# Patient Record
Sex: FEMALE | Race: WHITE | NOT HISPANIC OR LATINO | Employment: FULL TIME | URBAN - METROPOLITAN AREA
[De-identification: names, ages, dates, MRNs, and addresses within clinical notes are randomized per-mention and may not be internally consistent; named-entity substitution may affect disease eponyms.]

---

## 2021-02-02 ENCOUNTER — HOSPITAL ENCOUNTER (OUTPATIENT)
Dept: URGENT CARE | Facility: CLINIC | Age: 21
Discharge: HOME | End: 2021-02-02
Attending: NURSE PRACTITIONER

## 2021-02-04 LAB — SARS-COV-2 RNA SPEC QL NAA+PROBE: NOT DETECTED

## 2021-08-01 ENCOUNTER — HOSPITAL ENCOUNTER (OUTPATIENT)
Facility: HOSPITAL | Age: 21
Discharge: LEFT BEFORE DISCHARGE | End: 2021-08-01
Attending: OBSTETRICS & GYNECOLOGY | Admitting: OBSTETRICS & GYNECOLOGY

## 2021-08-01 ENCOUNTER — HOSPITAL ENCOUNTER (OUTPATIENT)
Facility: HOSPITAL | Age: 21
End: 2021-08-01
Attending: OBSTETRICS & GYNECOLOGY | Admitting: OBSTETRICS & GYNECOLOGY

## 2021-08-01 PROCEDURE — G0463 HOSPITAL OUTPT CLINIC VISIT: HCPCS

## 2021-08-02 NOTE — NURSING NOTE
Patient is leaving AMA from triage. Patient educated on kick counts and early signs of labor. Informed patient to return to the hospital if she believes her water has ruptured or leaking. Written material given educating patient on concerns. Patient signed AMA paperwork.  -ju

## 2021-08-02 NOTE — NURSING NOTE
DR Stallings NOTIFIED of Roster Triage patient Complaining of contractions.    Patient requested to leave AMA patient is 0.5cm dilated. Patient stated she wanted to go to [facility] where she receives her care.

## 2021-08-02 NOTE — NURSING NOTE
Patient arrived to triage with complaints of contractions. Patient placed in triage room. Abdomen is soft and non tender upon palpation. No complaints of bleeding. Patient is feeling fetal movement. Patient's cervix was checked. Patient's cervix is 0.5cm dilated thick and high. There is no bleeding or fluid noted.   After being checked patient requested to leave AMA without further evaluation. Patient stated she did not want to receive care at this hospital and wanted to go to [facility] where she receives care.   -JU

## 2022-05-03 VITALS
HEART RATE: 83 BPM | OXYGEN SATURATION: 100 % | SYSTOLIC BLOOD PRESSURE: 137 MMHG | HEIGHT: 63 IN | RESPIRATION RATE: 18 BRPM | TEMPERATURE: 98.1 F | BODY MASS INDEX: 36.84 KG/M2 | DIASTOLIC BLOOD PRESSURE: 100 MMHG | WEIGHT: 207.89 LBS

## 2022-05-03 LAB
BASOPHILS # BLD AUTO: 0.04 10*3/MM3 (ref 0–0.2)
BASOPHILS NFR BLD AUTO: 0.4 % (ref 0–1.5)
DEPRECATED RDW RBC AUTO: 45.8 FL (ref 37–54)
EOSINOPHIL # BLD AUTO: 0.18 10*3/MM3 (ref 0–0.4)
EOSINOPHIL NFR BLD AUTO: 1.7 % (ref 0.3–6.2)
ERYTHROCYTE [DISTWIDTH] IN BLOOD BY AUTOMATED COUNT: 13.6 % (ref 12.3–15.4)
HCG INTACT+B SERPL-ACNC: <0.5 MIU/ML
HCT VFR BLD AUTO: 42.1 % (ref 34–46.6)
HGB BLD-MCNC: 13.6 G/DL (ref 12–15.9)
IMM GRANULOCYTES # BLD AUTO: 0.02 10*3/MM3 (ref 0–0.05)
IMM GRANULOCYTES NFR BLD AUTO: 0.2 % (ref 0–0.5)
LYMPHOCYTES # BLD AUTO: 2.59 10*3/MM3 (ref 0.7–3.1)
LYMPHOCYTES NFR BLD AUTO: 24.4 % (ref 19.6–45.3)
MCH RBC QN AUTO: 29.3 PG (ref 26.6–33)
MCHC RBC AUTO-ENTMCNC: 32.3 G/DL (ref 31.5–35.7)
MCV RBC AUTO: 90.7 FL (ref 79–97)
MONOCYTES # BLD AUTO: 0.44 10*3/MM3 (ref 0.1–0.9)
MONOCYTES NFR BLD AUTO: 4.2 % (ref 5–12)
NEUTROPHILS NFR BLD AUTO: 69.1 % (ref 42.7–76)
NEUTROPHILS NFR BLD AUTO: 7.33 10*3/MM3 (ref 1.7–7)
NRBC BLD AUTO-RTO: 0 /100 WBC (ref 0–0.2)
PLATELET # BLD AUTO: 261 10*3/MM3 (ref 140–450)
PMV BLD AUTO: 10.4 FL (ref 6–12)
RBC # BLD AUTO: 4.64 10*6/MM3 (ref 3.77–5.28)
WBC NRBC COR # BLD: 10.6 10*3/MM3 (ref 3.4–10.8)

## 2022-05-03 PROCEDURE — 36415 COLL VENOUS BLD VENIPUNCTURE: CPT

## 2022-05-03 PROCEDURE — 99282 EMERGENCY DEPT VISIT SF MDM: CPT

## 2022-05-03 PROCEDURE — 85025 COMPLETE CBC W/AUTO DIFF WBC: CPT

## 2022-05-03 PROCEDURE — 84702 CHORIONIC GONADOTROPIN TEST: CPT

## 2022-05-03 RX ORDER — SODIUM CHLORIDE 0.9 % (FLUSH) 0.9 %
10 SYRINGE (ML) INJECTION AS NEEDED
Status: DISCONTINUED | OUTPATIENT
Start: 2022-05-03 | End: 2022-05-04 | Stop reason: HOSPADM

## 2022-05-04 ENCOUNTER — HOSPITAL ENCOUNTER (EMERGENCY)
Facility: HOSPITAL | Age: 22
Discharge: HOME | End: 2022-05-04
Attending: EMERGENCY MEDICINE | Admitting: EMERGENCY MEDICINE

## 2022-05-04 DIAGNOSIS — N93.9 VAGINAL BLEEDING: Primary | ICD-10-CM

## 2022-05-04 LAB
HOLD SPECIMEN: NORMAL
HOLD SPECIMEN: NORMAL
WHOLE BLOOD HOLD SPECIMEN: NORMAL
WHOLE BLOOD HOLD SPECIMEN: NORMAL

## 2022-05-04 PROCEDURE — 96372 THER/PROPH/DIAG INJ SC/IM: CPT

## 2022-05-04 PROCEDURE — 25010000002 KETOROLAC TROMETHAMINE PER 15 MG: Performed by: NURSE PRACTITIONER

## 2022-05-04 RX ORDER — KETOROLAC TROMETHAMINE 30 MG/ML
60 INJECTION, SOLUTION INTRAMUSCULAR; INTRAVENOUS ONCE
Status: COMPLETED | OUTPATIENT
Start: 2022-05-04 | End: 2022-05-04

## 2022-05-04 RX ORDER — NAPROXEN 500 MG/1
500 TABLET ORAL 2 TIMES DAILY PRN
Qty: 20 TABLET | Refills: 0 | OUTPATIENT
Start: 2022-05-04 | End: 2022-07-31

## 2022-05-04 RX ADMIN — KETOROLAC TROMETHAMINE 60 MG: 60 INJECTION, SOLUTION INTRAMUSCULAR at 02:10

## 2022-05-04 NOTE — ED PROVIDER NOTES
Time: 02:06 EDT  Arrived by: private vehicle  Chief Complaint: vaginal bleeding  History provided by: patient  History is limited by: N/A    History of Present Illness:  Patient is a 21 y.o. year old female that presents to the emergency department with vaginal bleeding. + home preg test Thursday and neg test at obgyn yesterday. Had pain and bleeding tonight and couldn't go to work      Vaginal Bleeding  Quality:  Dark red and clots  Severity:  Severe  Onset quality:  Gradual  Duration:  1 day  Timing:  Constant  Progression:  Unchanged  Chronicity:  New  Menstrual history:  Regular (Patient states she normally has heavy periods but is a weekly and is normally regular)  Number of pads used:  Every hour for 24 hours  Possible pregnancy: yes (+ home test but neg in office test)    Context: spontaneously    Relieved by:  Nothing  Worsened by:  Nothing  Ineffective treatments:  None tried  Associated symptoms: abdominal pain and back pain    Associated symptoms: no dizziness, no dyspareunia, no dysuria, no fatigue, no fever, no nausea and no vaginal discharge        Similar Symptoms Previously: No  Recently seen: No      Patient Care Team  Primary Care Provider: dr anthony peñaloza    Past Medical History:     Allergies   Allergen Reactions   • Ciprofloxacin Anaphylaxis     Swelling of throat.   • Penicillins Anaphylaxis and Shortness Of Breath   • Latex Hives     Past Medical History:   Diagnosis Date   • Hx of tubal ligation    • Substance abuse (HCC)      Past Surgical History:   Procedure Laterality Date   • TUBAL ABDOMINAL LIGATION       History reviewed. No pertinent family history.    Home Medications:  Prior to Admission medications    Not on File        Social History:   PT  reports that she has never smoked. She has never used smokeless tobacco. She reports previous alcohol use. No history on file for drug use.    Record Review:  I have reviewed the patient's records in [facility].     Review of Systems  Review  "of Systems   Constitutional: Positive for chills. Negative for fatigue and fever.   Respiratory: Negative for shortness of breath.    Cardiovascular: Negative for chest pain.   Gastrointestinal: Positive for abdominal pain. Negative for diarrhea, nausea and vomiting.   Genitourinary: Positive for pelvic pain and vaginal bleeding. Negative for dyspareunia, dysuria and vaginal discharge.   Musculoskeletal: Positive for back pain.   Skin: Negative.    Neurological: Negative for dizziness.   Hematological: Negative.    Psychiatric/Behavioral: Negative.         Physical Exam  /100   Pulse 83   Temp 98.1 °F (36.7 °C)   Resp 18   Ht 160 cm (63\")   Wt 94.3 kg (207 lb 14.3 oz)   LMP 05/02/2022   SpO2 100%   Breastfeeding Unknown   BMI 36.83 kg/m²     Physical Exam  Vitals and nursing note reviewed.   Constitutional:       General: She is not in acute distress.     Appearance: Normal appearance. She is not toxic-appearing.   HENT:      Head: Normocephalic and atraumatic.      Mouth/Throat:      Mouth: Mucous membranes are moist.   Eyes:      General: No scleral icterus.  Cardiovascular:      Rate and Rhythm: Normal rate and regular rhythm.      Pulses: Normal pulses.      Heart sounds: Normal heart sounds.   Pulmonary:      Effort: Pulmonary effort is normal. No respiratory distress.   Abdominal:      General: Bowel sounds are normal.      Palpations: Abdomen is soft.      Tenderness: There is abdominal tenderness ( low ab/pelvis). There is no right CVA tenderness or left CVA tenderness.   Genitourinary:     Comments: Patient defers.  States she has been at work all day and has not had a shower and also she does not have any extra tampons to put on afterward.  Offered her a pad if we do pelvic exam and she says she cannot use them because she has reactions to them so she just rather not have a pelvic  Musculoskeletal:         General: Normal range of motion.      Cervical back: Normal range of motion and neck " "supple.   Skin:     General: Skin is warm and dry.   Neurological:      Mental Status: She is alert and oriented to person, place, and time.   Psychiatric:         Mood and Affect: Mood normal.         Behavior: Behavior normal.          ED Course  /100   Pulse 83   Temp 98.1 °F (36.7 °C)   Resp 18   Ht 160 cm (63\")   Wt 94.3 kg (207 lb 14.3 oz)   LMP 05/02/2022   SpO2 100%   Breastfeeding Unknown   BMI 36.83 kg/m²   Results for orders placed or performed during the hospital encounter of 05/04/22   hCG, Quantitative, Pregnancy    Specimen: Blood   Result Value Ref Range    HCG Quantitative <0.50 mIU/mL   CBC Auto Differential    Specimen: Blood   Result Value Ref Range    WBC 10.60 3.40 - 10.80 10*3/mm3    RBC 4.64 3.77 - 5.28 10*6/mm3    Hemoglobin 13.6 12.0 - 15.9 g/dL    Hematocrit 42.1 34.0 - 46.6 %    MCV 90.7 79.0 - 97.0 fL    MCH 29.3 26.6 - 33.0 pg    MCHC 32.3 31.5 - 35.7 g/dL    RDW 13.6 12.3 - 15.4 %    RDW-SD 45.8 37.0 - 54.0 fl    MPV 10.4 6.0 - 12.0 fL    Platelets 261 140 - 450 10*3/mm3    Neutrophil % 69.1 42.7 - 76.0 %    Lymphocyte % 24.4 19.6 - 45.3 %    Monocyte % 4.2 (L) 5.0 - 12.0 %    Eosinophil % 1.7 0.3 - 6.2 %    Basophil % 0.4 0.0 - 1.5 %    Immature Grans % 0.2 0.0 - 0.5 %    Neutrophils, Absolute 7.33 (H) 1.70 - 7.00 10*3/mm3    Lymphocytes, Absolute 2.59 0.70 - 3.10 10*3/mm3    Monocytes, Absolute 0.44 0.10 - 0.90 10*3/mm3    Eosinophils, Absolute 0.18 0.00 - 0.40 10*3/mm3    Basophils, Absolute 0.04 0.00 - 0.20 10*3/mm3    Immature Grans, Absolute 0.02 0.00 - 0.05 10*3/mm3    nRBC 0.0 0.0 - 0.2 /100 WBC   Green Top (Gel)   Result Value Ref Range    Extra Tube Hold Specimen for Add Ons    Lavender Top   Result Value Ref Range    Extra Tube Hold Specimen for Add Ons    Gold Top - SST   Result Value Ref Range    Extra Tube Hold Specimen for Add Ons    Light Blue Top   Result Value Ref Range    Extra Tube Hold Specimen for Add Ons      Medications   sodium chloride 0.9 % " flush 10 mL (has no administration in time range)   ketorolac (TORADOL) injection 60 mg (has no administration in time range)     No results found.      Medical Decision Making:                     MDM  Number of Diagnoses or Management Options  Diagnosis management comments: The patient presents with vaginal bleeding. Possible etiology of vaginal bleeding were considered, but not limited to; ectopic pregnancy, spontaneous miscarriage, gestational trophoblastic disease, implantation bleeding, molar pregnancy, disfunctional uterine bleeding, and fibroids. The patient is well appearing and resting comfortably. There are no indications for transfusion. After careful consideration the patient is safe and stable to be discharged home with an outpatient OB/GYN follow-up. Patient was counseled to return to the ER or follow-up with their OB/GYN in 48 hours especially for worsening of her bleeding or increased pain. The patient has expressed a clear and thorough understanding and his agreed to follow-up as instructed.         Amount and/or Complexity of Data Reviewed  Clinical lab tests: reviewed and ordered  Tests in the medicine section of CPT®: ordered and reviewed    Risk of Complications, Morbidity, and/or Mortality  Presenting problems: low  Diagnostic procedures: low  Management options: low    Patient Progress  Patient progress: stable       Final diagnoses:   Vaginal bleeding        Disposition:  ED Disposition     ED Disposition   Discharge    Condition   Stable    Comment   --              Karla Viramontes, APRN  05/04/22 0206

## 2022-05-04 NOTE — DISCHARGE INSTRUCTIONS
Follow-up with your OB/GYN on Friday    Take medication as prescribed for pain    Heating pad for comfort    Return for new or worsening symptoms

## 2022-07-02 ENCOUNTER — APPOINTMENT (OUTPATIENT)
Dept: CT IMAGING | Facility: HOSPITAL | Age: 22
End: 2022-07-02

## 2022-07-02 ENCOUNTER — APPOINTMENT (OUTPATIENT)
Dept: GENERAL RADIOLOGY | Facility: HOSPITAL | Age: 22
End: 2022-07-02

## 2022-07-02 ENCOUNTER — HOSPITAL ENCOUNTER (EMERGENCY)
Facility: HOSPITAL | Age: 22
Discharge: HOME | End: 2022-07-02
Attending: EMERGENCY MEDICINE | Admitting: EMERGENCY MEDICINE

## 2022-07-02 VITALS
TEMPERATURE: 98.3 F | SYSTOLIC BLOOD PRESSURE: 147 MMHG | WEIGHT: 208.34 LBS | BODY MASS INDEX: 36.91 KG/M2 | HEART RATE: 112 BPM | DIASTOLIC BLOOD PRESSURE: 81 MMHG | OXYGEN SATURATION: 98 % | HEIGHT: 63 IN | RESPIRATION RATE: 26 BRPM

## 2022-07-02 DIAGNOSIS — S00.83XA CONTUSION OF FACE, INITIAL ENCOUNTER: ICD-10-CM
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
## 2022-07-02 DIAGNOSIS — M25.571 ACUTE RIGHT ANKLE PAIN: ICD-10-CM

## 2022-07-02 DIAGNOSIS — M79.642 BILATERAL HAND PAIN: ICD-10-CM

## 2022-07-02 DIAGNOSIS — M79.641 BILATERAL HAND PAIN: ICD-10-CM

## 2022-07-02 DIAGNOSIS — S01.512A GUM LACERATION: ICD-10-CM

## 2022-07-02 DIAGNOSIS — M25.549 PAIN IN MULTIPLE FINGER JOINTS: ICD-10-CM

## 2022-07-02 DIAGNOSIS — S41.131A: ICD-10-CM

## 2022-07-02 PROCEDURE — 73120 X-RAY EXAM OF HAND: CPT

## 2022-07-02 PROCEDURE — 99284 EMERGENCY DEPT VISIT MOD MDM: CPT

## 2022-07-02 PROCEDURE — 74176 CT ABD & PELVIS W/O CONTRAST: CPT

## 2022-07-02 PROCEDURE — 63710000001 ONDANSETRON ODT 4 MG TABLET DISPERSIBLE: Performed by: EMERGENCY MEDICINE

## 2022-07-02 PROCEDURE — 71250 CT THORAX DX C-: CPT

## 2022-07-02 PROCEDURE — 70450 CT HEAD/BRAIN W/O DYE: CPT

## 2022-07-02 PROCEDURE — 72125 CT NECK SPINE W/O DYE: CPT

## 2022-07-02 PROCEDURE — 73600 X-RAY EXAM OF ANKLE: CPT

## 2022-07-02 RX ORDER — KETOROLAC TROMETHAMINE 30 MG/ML
30 INJECTION, SOLUTION INTRAMUSCULAR; INTRAVENOUS ONCE
Status: DISCONTINUED | OUTPATIENT
Start: 2022-07-02 | End: 2022-07-02

## 2022-07-02 RX ORDER — ONDANSETRON 4 MG/1
4 TABLET, ORALLY DISINTEGRATING ORAL ONCE
Status: COMPLETED | OUTPATIENT
Start: 2022-07-02 | End: 2022-07-02

## 2022-07-02 RX ORDER — CLINDAMYCIN HYDROCHLORIDE 300 MG/1
300 CAPSULE ORAL 3 TIMES DAILY
Qty: 10 CAPSULE | Refills: 0 | OUTPATIENT
Start: 2022-07-02 | End: 2022-07-31

## 2022-07-02 RX ORDER — IBUPROFEN 400 MG/1
800 TABLET ORAL ONCE
Status: COMPLETED | OUTPATIENT
Start: 2022-07-02 | End: 2022-07-02

## 2022-07-02 RX ORDER — HYDROCODONE BITARTRATE AND ACETAMINOPHEN 5; 325 MG/1; MG/1
1 TABLET ORAL EVERY 6 HOURS PRN
Qty: 12 TABLET | Refills: 0 | OUTPATIENT
Start: 2022-07-02 | End: 2022-07-31

## 2022-07-02 RX ORDER — ONDANSETRON 2 MG/ML
4 INJECTION INTRAMUSCULAR; INTRAVENOUS ONCE
Status: DISCONTINUED | OUTPATIENT
Start: 2022-07-02 | End: 2022-07-02

## 2022-07-02 RX ORDER — IBUPROFEN 800 MG/1
800 TABLET ORAL EVERY 6 HOURS PRN
Qty: 20 TABLET | Refills: 0 | OUTPATIENT
Start: 2022-07-02 | End: 2022-07-31

## 2022-07-02 RX ORDER — SODIUM CHLORIDE 0.9 % (FLUSH) 0.9 %
10 SYRINGE (ML) INJECTION AS NEEDED
Status: DISCONTINUED | OUTPATIENT
Start: 2022-07-02 | End: 2022-07-02

## 2022-07-02 RX ORDER — HYDROCODONE BITARTRATE AND ACETAMINOPHEN 5; 325 MG/1; MG/1
1 TABLET ORAL EVERY 6 HOURS PRN
Status: DISCONTINUED | OUTPATIENT
Start: 2022-07-02 | End: 2022-07-02 | Stop reason: HOSPADM

## 2022-07-02 RX ORDER — ONDANSETRON 4 MG/1
4 TABLET, ORALLY DISINTEGRATING ORAL EVERY 8 HOURS PRN
Qty: 20 TABLET | Refills: 0 | OUTPATIENT
Start: 2022-07-02 | End: 2022-07-31

## 2022-07-02 RX ADMIN — HYDROCODONE BITARTRATE AND ACETAMINOPHEN 1 TABLET: 5; 325 TABLET ORAL at 08:53

## 2022-07-02 RX ADMIN — ONDANSETRON 4 MG: 4 TABLET, ORALLY DISINTEGRATING ORAL at 10:00

## 2022-07-02 RX ADMIN — HYDROCODONE BITARTRATE AND ACETAMINOPHEN 1 TABLET: 5; 325 TABLET ORAL at 15:50

## 2022-07-02 RX ADMIN — IBUPROFEN 800 MG: 400 TABLET, FILM COATED ORAL at 13:30

## 2022-07-02 NOTE — DISCHARGE INSTRUCTIONS
Please follow-up with your primary care provider in 2 to 3 days so that you can arrange for her to review all of your injuries so that she may be able to follow your care.  If she is unable to send you to the appropriate person to help your gum/cheek injury, please call the [facility].  Their information has been provided for you.  You will need the services of the oral and maxillofacial surgery department.  When you leave here, please go directly to the 's office as discussed between you and the [facility] nurse that completed your exam.     Please wash the wound on your right arm with warm soapy water and pat dry.  Please apply triple antibiotic ointment such as Neosporin to the wound.  Do not remove the Steri-Strips.  Allow them to fall off on their own.    Please take the antibiotics that been prescribed you today until they are gone to ensure that your wound in your mouth and your arm did not get infected.  Take the other medications as needed for pain and discomfort.    Please return to the emergency department if you develop worsening of pain, starts to show signs symptoms of infection to your mouth wound or your arm wound, if you feel threatened at all in any way return to the ER or go immediately to the please department.  Please follow-up with your primary care physician to arrange follow-up care for all wounds and injuries.

## 2022-07-02 NOTE — SIGNIFICANT NOTE
07/02/22 1512   Plan   Plan ANDREEA An was made aware that pt wanted someone to notify  that pt would not be able to make it to supised visit today at 4. ANDREEA contacted worker, Caryl Montes to make her aware and sent an email to notify worker as well as a follow up.
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
## 2022-07-02 NOTE — ED NOTES
Patient educated on reasoning behind why orders and labs were placed the way that they were. Patient understood and acknowledged.
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
## 2022-07-02 NOTE — SIGNIFICANT NOTE
07/02/22 1206   Plan   Plan ANDREEA provided resources and community resource packet to [facility] nurse for pt.

## 2023-12-07 VITALS
HEART RATE: 86 BPM | RESPIRATION RATE: 16 BRPM | DIASTOLIC BLOOD PRESSURE: 85 MMHG | HEIGHT: 63 IN | SYSTOLIC BLOOD PRESSURE: 112 MMHG | OXYGEN SATURATION: 95 % | WEIGHT: 224.65 LBS | BODY MASS INDEX: 39.8 KG/M2 | TEMPERATURE: 98 F

## 2023-12-07 PROCEDURE — 99282 EMERGENCY DEPT VISIT SF MDM: CPT

## 2023-12-08 ENCOUNTER — HOSPITAL ENCOUNTER (EMERGENCY)
Facility: HOSPITAL | Age: 23
Discharge: HOME | End: 2023-12-08
Attending: EMERGENCY MEDICINE
Payer: COMMERCIAL

## 2023-12-08 DIAGNOSIS — L72.3 SEBACEOUS CYST: Primary | ICD-10-CM

## 2023-12-08 RX ORDER — HYDROCODONE BITARTRATE AND ACETAMINOPHEN 5; 325 MG/1; MG/1
1 TABLET ORAL EVERY 6 HOURS PRN
Qty: 12 TABLET | Refills: 0 | Status: SHIPPED | OUTPATIENT
Start: 2023-12-08

## 2023-12-08 RX ORDER — CLINDAMYCIN HYDROCHLORIDE 150 MG/1
450 CAPSULE ORAL 3 TIMES DAILY
Qty: 90 CAPSULE | Refills: 0 | Status: SHIPPED | OUTPATIENT
Start: 2023-12-08 | End: 2023-12-18

## 2023-12-08 RX ORDER — OXYCODONE AND ACETAMINOPHEN 7.5; 325 MG/1; MG/1
1 TABLET ORAL EVERY 6 HOURS PRN
Qty: 12 TABLET | Refills: 0 | Status: SHIPPED | OUTPATIENT
Start: 2023-12-08 | End: 2023-12-08
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
## 2023-12-08 NOTE — Clinical Note
[facility] EMERGENCY ROOM  [address]  [place] [address]  Phone: [phone]    Irene Tabares was seen and treated in our emergency department on 12/7/2023.  She may return to work on 12/08/2023.  Please excuse Irene for 12/6/2023 as well        Thank you for choosing [facility].    Joo Ryan MD

## 2023-12-08 NOTE — ED PROVIDER NOTES
Time: 11:30 PM EST  Date of encounter:  12/7/2023  Independent Historian/Clinical History and Information was obtained by:   Patient    History is limited by: N/A    Chief Complaint   Patient presents with    Abscess    Wound Infection         History of Present Illness:  Patient is a 23 y.o. year old female who presents to the emergency department for evaluation of patient reports rupture of the cyst has been growing in her patient's car for approximately 2 years.  Reports her daughter accidentally kneed her in the stomach last night and tonight this is busted and she had thick white drainage from the cyst.  Patient reports she has a diagnosis of hidradenitis suppurativa.  Denies any fevers, chills, body aches. (LIAM Oliva Provider in Triage)      Patient Care Team  Primary Care Provider: Russ Cobb APRN    Past Medical History:     Allergies   Allergen Reactions    Ciprofloxacin Anaphylaxis     Swelling of throat.    Latex Hives    Penicillins Anaphylaxis and Shortness Of Breath     Past Medical History:   Diagnosis Date    Hidradenitis suppurativa     Hx of tubal ligation     Substance abuse      Past Surgical History:   Procedure Laterality Date    CYST REMOVAL      TUBAL ABDOMINAL LIGATION       No family history on file.    Home Medications:  Prior to Admission medications    Medication Sig Start Date End Date Taking? Authorizing Provider   clindamycin (CLEOCIN) 300 MG capsule Take 1 capsule by mouth Every 12 (Twelve) Hours. 7/14/23   Provider, MD Reyna   mupirocin (BACTROBAN) 2 % ointment Apply 1 application  topically to the appropriate area as directed 3 (Three) Times a Day. 8/14/23   Christiano Carrizales MD        Social History:   Social History     Tobacco Use    Smoking status: Every Day     Packs/day: 1.00     Years: 10.00     Additional pack years: 0.00     Total pack years: 10.00     Types: Cigarettes    Smokeless tobacco: Never   Vaping Use    Vaping Use: Never used  "  Substance Use Topics    Alcohol use: Not Currently    Drug use: Not Currently         Review of Systems:  Review of Systems   Constitutional:  Negative for chills and fever.   HENT:  Negative for congestion, rhinorrhea and sore throat.    Eyes:  Negative for pain and visual disturbance.   Respiratory:  Negative for apnea, cough, chest tightness and shortness of breath.    Cardiovascular:  Negative for chest pain and palpitations.   Gastrointestinal:  Negative for abdominal pain, diarrhea, nausea and vomiting.   Genitourinary:  Negative for difficulty urinating and dysuria.   Musculoskeletal:  Negative for joint swelling and myalgias.   Skin:  Positive for color change and wound.   Neurological:  Negative for seizures and headaches.   Psychiatric/Behavioral: Negative.     All other systems reviewed and are negative.       Physical Exam:  /85 (BP Location: Left arm, Patient Position: Sitting)   Pulse 86   Temp 98 °F (36.7 °C) (Oral)   Resp 16   Ht 160 cm (63\")   Wt 102 kg (224 lb 10.4 oz)   SpO2 95%   BMI 39.79 kg/m²         Physical Exam  Vitals and nursing note reviewed.   Constitutional:       General: She is not in acute distress.     Appearance: Normal appearance. She is not toxic-appearing.   HENT:      Head: Normocephalic and atraumatic.      Jaw: There is normal jaw occlusion.      Mouth/Throat:      Mouth: Mucous membranes are moist.   Eyes:      General: Lids are normal.      Extraocular Movements: Extraocular movements intact.      Conjunctiva/sclera: Conjunctivae normal.      Pupils: Pupils are equal, round, and reactive to light.   Cardiovascular:      Rate and Rhythm: Normal rate and regular rhythm.      Pulses: Normal pulses.      Heart sounds: Normal heart sounds.   Pulmonary:      Effort: Pulmonary effort is normal. No respiratory distress.      Breath sounds: Normal breath sounds. No wheezing or rhonchi.   Abdominal:      General: Abdomen is flat. There is no distension.      " Palpations: Abdomen is soft.      Tenderness: There is no abdominal tenderness. There is no guarding or rebound.   Musculoskeletal:         General: Normal range of motion.      Cervical back: Normal range of motion and neck supple.      Right lower leg: No edema.      Left lower leg: No edema.   Skin:     General: Skin is warm and dry.      Comments: Redness and induration around wound with no visible drainage at this time.   Neurological:      General: No focal deficit present.      Mental Status: She is alert and oriented to person, place, and time. Mental status is at baseline.   Psychiatric:         Mood and Affect: Mood normal.         Behavior: Behavior normal.                      Procedures:  Procedures      Medical Decision Making:      Comorbidities that affect care:    Hidradenitis suppurativa    External Notes reviewed:    None      The following orders were placed and all results were independently analyzed by me:  No orders of the defined types were placed in this encounter.      Medications Given in the Emergency Department:  Medications - No data to display     ED Course:    The patient was initially evaluated in the triage area where orders were placed. The patient was later dispositioned by Joo Ryan MD.      The patient was advised to stay for completion of workup which includes but is not limited to communication of labs and radiological results, reassessment and plan. The patient was advised that leaving prior to disposition by a provider could result in critical findings that are not communicated to the patient.     ED Course as of 12/08/23 0102   Thu Dec 07, 2023   2332   --- PROVIDER IN TRIAGE NOTE ---    The patient was evaluated by Aneta isbell in triage. Orders were placed and the patient is currently awaiting disposition.      [CE]      ED Course User Index  [CE] Aneta Garcia, LIAM       Labs:    Lab Results (last 24 hours)       ** No results found for the last 24 hours.  **             Imaging:    No Radiology Exams Resulted Within Past 24 Hours      Differential Diagnosis and Discussion:      Abscess: Differential diagnosis for an abscess includes but is not limited to bacterial or fungal infections, foreign body reactions, malignancies, and autoimmune or inflammatory conditions.        MDM  Number of Diagnoses or Management Options  Sebaceous cyst  Diagnosis management comments: In summary this is a 23-year-old female with a history of hydronephrosis reported who presents to the emerged department for evaluation and treatment of a sebaceous cyst/abscess at her previous  section scar.  There is some mild surrounding erythema and induration, no purulent drainage at this time.  No fluctuance noted.  Patient's been given prescription for clindamycin and Percocet.  Very strict return to ER and follow-up instructions have been provided to the patient.                   Patient Care Considerations:    LABS: I considered ordering labs, however doubt metabolic derangement      Consultants/Shared Management Plan:    None    Social Determinants of Health:    Patient is independent, reliable, and has access to care.       Disposition and Care Coordination:    Discharged: The patient is suitable and stable for discharge with no need for consideration of observation or admission.    I have explained the patient´s condition, diagnoses and treatment plan based on the information available to me at this time. I have answered questions and addressed any concerns. The patient has a good  understanding of the patient´s diagnosis, condition, and treatment plan as can be expected at this point. The vital signs have been stable. The patient´s condition is stable and appropriate for discharge from the emergency department.      The patient will pursue further outpatient evaluation with the primary care physician or other designated or consulting physician as outlined in the discharge  instructions. They are agreeable to this plan of care and follow-up instructions have been explained in detail. The patient has received these instructions in written format and have expressed an understanding of the discharge instructions. The patient is aware that any significant change in condition or worsening of symptoms should prompt an immediate return to this or the closest emergency department or call to [facility].  I have explained discharge medications and the need for follow up with the patient/caretakers. This was also printed in the discharge instructions. Patient was discharged with the following medications and follow up:      Medication List        New Prescriptions      oxyCODONE-acetaminophen 7.5-325 MG per tablet  Commonly known as: PERCOCET  Take 1 tablet by mouth Every 6 (Six) Hours As Needed for Severe Pain.            Changed      * clindamycin 300 MG capsule  Commonly known as: CLEOCIN  What changed: Another medication with the same name was added. Make sure you understand how and when to take each.     * clindamycin 150 MG capsule  Commonly known as: CLEOCIN  Take 3 capsules by mouth 3 (Three) Times a Day for 10 days.  What changed: You were already taking a medication with the same name, and this prescription was added. Make sure you understand how and when to take each.           * This list has 2 medication(s) that are the same as other medications prescribed for you. Read the directions carefully, and ask your doctor or other care provider to review them with you.                   Where to Get Your Medications        These medications were sent to [facility]/pharmacy #[id number] - [place], [place] - [address] - [phone]  - [phone]   [address] [place] [address]      Hours: 24-hours Phone: [phone]   clindamycin 150 MG capsule  oxyCODONE-acetaminophen 7.5-325 MG per tablet      Russ Cobb, APRN  [address] [place] [address]  [id number]  [phone]    In 1 week         Final diagnoses:   Sebaceous cyst        ED Disposition       ED Disposition   Discharge    Condition   Stable    Comment   --               This medical record created using voice recognition software.             Joo Ryan MD  12/08/23 0102

## 2023-12-08 NOTE — Clinical Note
[facility] EMERGENCY ROOM  [address]  [place] [address]  Phone: [phone]    Irene Tabares was seen and treated in our emergency department on 12/7/2023.  She may return to work on 12/08/2023.  Please excuse Irene for 12/6/2023 as well        Thank you for choosing [facility].    Joo Ryan MD

## 2024-01-08 LAB
BACTERIA UR QL AUTO: ABNORMAL /HPF
BILIRUB UR QL STRIP: ABNORMAL
CLARITY UR: ABNORMAL
COLOR UR: ABNORMAL
GLUCOSE UR STRIP-MCNC: NEGATIVE MG/DL
HGB UR QL STRIP.AUTO: NEGATIVE
HYALINE CASTS UR QL AUTO: ABNORMAL /LPF
KETONES UR QL STRIP: ABNORMAL
LEUKOCYTE ESTERASE UR QL STRIP.AUTO: ABNORMAL
NITRITE UR QL STRIP: NEGATIVE
PH UR STRIP.AUTO: 8 [PH] (ref 5–8)
PROT UR QL STRIP: NEGATIVE
RBC # UR STRIP: ABNORMAL /HPF
REF LAB TEST METHOD: ABNORMAL
SP GR UR STRIP: 1.02 (ref 1–1.03)
SQUAMOUS #/AREA URNS HPF: ABNORMAL /HPF
UROBILINOGEN UR QL STRIP: ABNORMAL
WBC # UR STRIP: ABNORMAL /HPF

## 2024-01-08 PROCEDURE — 87510 GARDNER VAG DNA DIR PROBE: CPT

## 2024-01-08 PROCEDURE — 87491 CHLMYD TRACH DNA AMP PROBE: CPT

## 2024-01-08 PROCEDURE — 87660 TRICHOMONAS VAGIN DIR PROBE: CPT

## 2024-01-08 PROCEDURE — 99283 EMERGENCY DEPT VISIT LOW MDM: CPT

## 2024-01-08 PROCEDURE — 87591 N.GONORRHOEAE DNA AMP PROB: CPT

## 2024-01-08 PROCEDURE — 87480 CANDIDA DNA DIR PROBE: CPT

## 2024-01-08 PROCEDURE — 81001 URINALYSIS AUTO W/SCOPE: CPT

## 2024-01-09 ENCOUNTER — HOSPITAL ENCOUNTER (EMERGENCY)
Facility: HOSPITAL | Age: 24
Discharge: HOME | End: 2024-01-09
Attending: EMERGENCY MEDICINE | Admitting: EMERGENCY MEDICINE
Payer: COMMERCIAL

## 2024-01-09 VITALS
BODY MASS INDEX: 35.44 KG/M2 | SYSTOLIC BLOOD PRESSURE: 99 MMHG | OXYGEN SATURATION: 99 % | HEIGHT: 63 IN | HEART RATE: 69 BPM | DIASTOLIC BLOOD PRESSURE: 62 MMHG | WEIGHT: 200 LBS | RESPIRATION RATE: 18 BRPM | TEMPERATURE: 98 F

## 2024-01-09 DIAGNOSIS — L73.2 HIDRADENITIS: Primary | ICD-10-CM

## 2024-01-09 DIAGNOSIS — B96.89 BACTERIAL VAGINOSIS: ICD-10-CM

## 2024-01-09 DIAGNOSIS — N76.0 BACTERIAL VAGINOSIS: ICD-10-CM

## 2024-01-09 LAB
BILIRUB UR QL STRIP: ABNORMAL
C TRACH RRNA CVX QL NAA+PROBE: NOT DETECTED
CANDIDA SPECIES: NEGATIVE
CLARITY UR: CLEAR
COLOR UR: ABNORMAL
GARDNERELLA VAGINALIS: POSITIVE
GLUCOSE UR STRIP-MCNC: NEGATIVE MG/DL
HGB UR QL STRIP.AUTO: NEGATIVE
KETONES UR QL STRIP: ABNORMAL
LEUKOCYTE ESTERASE UR QL STRIP.AUTO: NEGATIVE
N GONORRHOEA RRNA SPEC QL NAA+PROBE: NOT DETECTED
NITRITE UR QL STRIP: NEGATIVE
PH UR STRIP.AUTO: 6 [PH] (ref 5–8)
PROT UR QL STRIP: NEGATIVE
SP GR UR STRIP: >1.03 (ref 1–1.03)
T VAGINALIS DNA VAG QL PROBE+SIG AMP: NEGATIVE
UROBILINOGEN UR QL STRIP: ABNORMAL

## 2024-01-09 PROCEDURE — 81003 URINALYSIS AUTO W/O SCOPE: CPT

## 2024-01-09 RX ORDER — HYDROCODONE BITARTRATE AND ACETAMINOPHEN 7.5; 325 MG/1; MG/1
1 TABLET ORAL EVERY 4 HOURS PRN
Qty: 15 TABLET | Refills: 0 | Status: SHIPPED | OUTPATIENT
Start: 2024-01-09

## 2024-01-09 RX ORDER — OXYCODONE HYDROCHLORIDE AND ACETAMINOPHEN 5; 325 MG/1; MG/1
1 TABLET ORAL ONCE
Status: COMPLETED | OUTPATIENT
Start: 2024-01-09 | End: 2024-01-09

## 2024-01-09 RX ORDER — METFORMIN HYDROCHLORIDE 500 MG/1
500 TABLET, EXTENDED RELEASE ORAL DAILY
COMMUNITY

## 2024-01-09 RX ADMIN — OXYCODONE HYDROCHLORIDE AND ACETAMINOPHEN 1 TABLET: 5; 325 TABLET ORAL at 04:47

## 2024-01-09 NOTE — Clinical Note
Psychiatric EMERGENCY ROOM  [address]  [place] [address]  Phone: [phone]    Irene Tabares was seen and treated in our emergency department on 1/8/2024.  She may return to work on 01/11/2024.         Thank you for choosing [facility].    Amy Zarate APRN

## 2024-01-09 NOTE — Clinical Note
[facility] EMERGENCY ROOM  [address]  [place] [address]  Phone: [phone]    Irene Tabares was seen and treated in our emergency department on 1/8/2024.  She may return to work on 01/10/2024.         Thank you for choosing [facility].    Amy Zarate APRN

## 2024-01-09 NOTE — ED PROVIDER NOTES
"Time: 10:37 PM EST  Date of encounter:  2024  Independent Historian/Clinical History and Information was obtained by:   Patient    History is limited by: N/A    Chief Complaint   Patient presents with    Cyst     Armpit and lower abdomen           History of Present Illness:  The patient is a 23 y.o. year old female who presents to the emergency department for evaluation of cyst.  She reports a history of Hydradenitis Spiriva.  She reports a chronic area in her  scar.  She states that it had opened up and had been draining recently.  She also reports an area in her right axilla that she states is starting to swell and hurt.  I cannot appreciate any hardened indurated areas at this time.  The patient states that it is \"deep\".  She denies any fevers.  She states has not drained at all in her right axilla.  She states that she has seen a dermatologist and they had placed her on metformin and she states that her symptoms have not improved.  She states that she is currently on clindamycin that she had had prescribed back in  during an ED visit that she states she did not start taking until just a few days ago.  She states that she used to see a surgeon for this down and [place] states that he retired.  The patient is also wanting a STI check due to a possible recent exposure to chlamydia.    Patient Care Team  Primary Care Provider: Russ Cobb APRN    Past Medical History:     Allergies   Allergen Reactions    Ciprofloxacin Anaphylaxis     Swelling of throat.    Latex Hives    Penicillins Anaphylaxis and Shortness Of Breath     Past Medical History:   Diagnosis Date    Hidradenitis suppurativa     Hx of tubal ligation     Substance abuse      Past Surgical History:   Procedure Laterality Date    CYST REMOVAL      TUBAL ABDOMINAL LIGATION       History reviewed. No pertinent family history.    Home Medications:  Prior to Admission medications    Medication Sig Start Date End Date Taking? " "Authorizing Provider   clindamycin (CLEOCIN) 300 MG capsule Take 1 capsule by mouth Every 12 (Twelve) Hours. 7/14/23   Provider, MD Reyna   HYDROcodone-acetaminophen (NORCO) 5-325 MG per tablet Take 1 tablet by mouth Every 6 (Six) Hours As Needed (Pain). 12/8/23   Joo Ryan MD   mupirocin (BACTROBAN) 2 % ointment Apply 1 application  topically to the appropriate area as directed 3 (Three) Times a Day. 8/14/23   Christiano Carrizales MD        Social History:   Social History     Tobacco Use    Smoking status: Every Day     Packs/day: 1.00     Years: 10.00     Additional pack years: 0.00     Total pack years: 10.00     Types: Cigarettes    Smokeless tobacco: Never   Vaping Use    Vaping Use: Never used   Substance Use Topics    Alcohol use: Not Currently    Drug use: Not Currently         Review of Systems:  Review of Systems   Constitutional:  Negative for chills and fever.   HENT:  Negative for congestion, ear pain and sore throat.    Eyes:  Negative for pain.   Respiratory:  Negative for cough, chest tightness and shortness of breath.    Cardiovascular:  Negative for chest pain.   Gastrointestinal:  Negative for abdominal pain, diarrhea, nausea and vomiting.   Genitourinary:  Negative for flank pain and hematuria.   Musculoskeletal:  Positive for myalgias. Negative for back pain, joint swelling, neck pain and neck stiffness.   Skin:  Positive for color change and wound. Negative for pallor.   Neurological:  Negative for seizures and headaches.   All other systems reviewed and are negative.       Physical Exam:  BP 99/62   Pulse 69   Temp 98 °F (36.7 °C) (Oral)   Resp 18   Ht 160 cm (63\")   Wt 90.7 kg (200 lb)   SpO2 99%   BMI 35.43 kg/m²         Physical Exam  Vitals and nursing note reviewed.   Constitutional:       General: She is not in acute distress.     Appearance: Normal appearance. She is not ill-appearing or toxic-appearing.   HENT:      Head: Normocephalic and atraumatic.   Eyes:     "  General: No scleral icterus.     Conjunctiva/sclera: Conjunctivae normal.      Pupils: Pupils are equal, round, and reactive to light.   Cardiovascular:      Rate and Rhythm: Normal rate and regular rhythm.      Pulses: Normal pulses.   Pulmonary:      Effort: Pulmonary effort is normal. No respiratory distress.   Musculoskeletal:         General: Swelling and tenderness present. Normal range of motion.      Cervical back: Normal range of motion.      Comments: To the right axilla area   Skin:     General: Skin is warm and dry.      Capillary Refill: Capillary refill takes less than 2 seconds.      Findings: Bruising and erythema present. No rash.   Neurological:      General: No focal deficit present.      Mental Status: She is alert and oriented to person, place, and time. Mental status is at baseline.   Psychiatric:         Mood and Affect: Mood normal.         Behavior: Behavior normal.                Procedures:  Procedures      Medical Decision Making:      Comorbidities that affect care:    Hidradenitis suppurativa , substance abuse    External Notes reviewed:    Previous Operation Note: Previous I&D notes from [place]      The following orders were placed and all results were independently analyzed by me:  Orders Placed This Encounter   Procedures    Gardnerella vaginalis, Trichomonas vaginalis, Candida albicans, DNA - Swab, Vagina    Chlamydia trachomatis, Neisseria gonorrhoeae, PCR - Swab, Cervix    Urinalysis With Microscopic If Indicated (No Culture) - Urine, Clean Catch    Urinalysis, Microscopic Only - Urine, Clean Catch    Urinalysis With Microscopic If Indicated (No Culture) - Urine, Clean Catch    Ambulatory Referral to General Surgery       Medications Given in the Emergency Department:  Medications   oxyCODONE-acetaminophen (PERCOCET) 5-325 MG per tablet 1 tablet (1 tablet Oral Given 1/9/24 4430)        ED Course:    The patient was initially evaluated in the triage area where orders were  placed. The patient was later dispositioned by LIAM Christopher.      The patient was advised to stay for completion of workup which includes but is not limited to communication of labs and radiological results, reassessment and plan. The patient was advised that leaving prior to disposition by a provider could result in critical findings that are not communicated to the patient.     ED Course as of 01/09/24 0630   Mon Jan 08, 2024 2238 --- PROVIDER IN TRIAGE NOTE ---    The patient was evaluated by La isbell in triage. Orders were placed and the patient is currently awaiting disposition.    [AJ]      ED Course User Index  [AJ] La Enrique PA-C       Labs:    Lab Results (last 24 hours)       Procedure Component Value Units Date/Time    Gardnerella vaginalis, Trichomonas vaginalis, Candida albicans, DNA - Swab, Vagina [[id number]]  (Abnormal) Collected: 01/08/24 2244    Specimen: Swab from Vagina Updated: 01/09/24 0002     GARDNERELLA VAGINALIS Positive     TRICHOMONAS VAGINALIS Negative     CANDIDA SPECIES Negative    Chlamydia trachomatis, Neisseria gonorrhoeae, PCR - Swab, Cervix [[id number]]  (Normal) Collected: 01/08/24 2244    Specimen: Swab from Cervix Updated: 01/09/24 0041     Chlamydia DNA by PCR Not Detected     Neisseria gonorrhoeae by PCR Not Detected    Urinalysis With Microscopic If Indicated (No Culture) - Urine, Clean Catch [[id number]]  (Abnormal) Collected: 01/08/24 2306    Specimen: Urine, Clean Catch Updated: 01/08/24 2313     Color, UA Dark Yellow     Appearance, UA Cloudy     pH, UA 8.0     Specific Gravity, UA 1.024     Glucose, UA Negative     Ketones, UA Trace     Bilirubin, UA Small (1+)     Blood, UA Negative     Protein, UA Negative     Leuk Esterase, UA Small (1+)     Nitrite, UA Negative     Urobilinogen, UA 1.0 E.U./dL    Urinalysis, Microscopic Only - Urine, Clean Catch [[id number]]  (Abnormal) Collected: 01/08/24 2306    Specimen: Urine, Clean Catch Updated:  01/08/24 2315     RBC, UA 0-2 /HPF      WBC, UA 6-10 /HPF      Bacteria, UA 3+ /HPF      Squamous Epithelial Cells, UA 21-30 /HPF      Hyaline Casts, UA 0-2 /LPF      Methodology Automated Microscopy    Urinalysis With Microscopic If Indicated (No Culture) - Urine, Clean Catch [[id number]]  (Abnormal) Collected: 01/09/24 0028    Specimen: Urine, Clean Catch Updated: 01/09/24 0041     Color, UA Dark Yellow     Appearance, UA Clear     pH, UA 6.0     Specific Gravity, UA >1.030     Glucose, UA Negative     Ketones, UA Trace     Bilirubin, UA Small (1+)     Blood, UA Negative     Protein, UA Negative     Leuk Esterase, UA Negative     Nitrite, UA Negative     Urobilinogen, UA 1.0 E.U./dL    Narrative:      Urine microscopic not indicated.             Imaging:    No Radiology Exams Resulted Within Past 24 Hours      Differential Diagnosis and Discussion:      Abscess: Differential diagnosis for an abscess includes but is not limited to bacterial or fungal infections, foreign body reactions, malignancies, and autoimmune or inflammatory conditions.  Extremity Pain: Differential diagnosis includes but is not limited to soft tissue sprain, tendonitis, tendon injury, dislocation, fracture, deep vein thrombosis, arterial insufficiency, osteoarthritis, bursitis, and ligamentous damage.    All labs were reviewed and interpreted by me.    MDM         Patient Care Considerations:    Antibiotics: The patient is currently on clindamycin at this time.      Consultants/Shared Management Plan:    None    Social Determinants of Health:    Patient is independent, reliable, and has access to care.       Disposition and Care Coordination:    Discharged: The patient is suitable and stable for discharge with no need for consideration of observation or admission.    I have explained the patient´s condition, diagnoses and treatment plan based on the information available to me at this time. I have answered questions and addressed any concerns.  The patient has a good  understanding of the patient´s diagnosis, condition, and treatment plan as can be expected at this point. The vital signs have been stable. The patient´s condition is stable and appropriate for discharge from the emergency department.      The patient will pursue further outpatient evaluation with the primary care physician or other designated or consulting physician as outlined in the discharge instructions. They are agreeable to this plan of care and follow-up instructions have been explained in detail. The patient has received these instructions in written format and have expressed an understanding of the discharge instructions. The patient is aware that any significant change in condition or worsening of symptoms should prompt an immediate return to this or the closest emergency department or call to 911.  I have explained discharge medications and the need for follow up with the patient/caretakers. This was also printed in the discharge instructions. Patient was discharged with the following medications and follow up:      Medication List        Changed      * HYDROcodone-acetaminophen 5-325 MG per tablet  Commonly known as: NORCO  Take 1 tablet by mouth Every 6 (Six) Hours As Needed (Pain).  What changed: Another medication with the same name was added. Make sure you understand how and when to take each.     * HYDROcodone-acetaminophen 7.5-325 MG per tablet  Commonly known as: NORCO  Take 1 tablet by mouth Every 4 (Four) Hours As Needed for Moderate Pain.  What changed: You were already taking a medication with the same name, and this prescription was added. Make sure you understand how and when to take each.           * This list has 2 medication(s) that are the same as other medications prescribed for you. Read the directions carefully, and ask your doctor or other care provider to review them with you.                   Where to Get Your Medications        These medications were sent  to [facility]/pharmacy #[id number] - [place], [place] - [address] - [phone]  - [phone]  [address] [place] [address]      Hours: 24-hours Phone: [phone]   HYDROcodone-acetaminophen 7.5-325 MG per tablet      Yuan Roman MD  [address] [place] [address]  [phone]    Call today  FOR FOLLOW UP    Yuan Roman MD  [address] [place] [address]  [phone]             Final diagnoses:   Hidradenitis   Bacterial vaginosis        ED Disposition       ED Disposition   Discharge    Condition   Stable    Comment   --               This medical record created using voice recognition software.             Amy Zarate, APRN  01/09/24 0630

## 2024-01-09 NOTE — DISCHARGE INSTRUCTIONS
Rest, take your meds as prescribed.  Complete the previously prescribed clindamycin.  You may also take over-the-counter Motrin as needed with these medications for pain.  Call Dr. Roman's office today to follow-up with him in the office concerning your chronic hidradenitis.  You may alternate warm and cool compresses to areas for comfort.  The clindamycin that you are currently taking should treat the bacterial vaginosis as well.  Return to the emergency department immediately for any acutely developing fever, any increase in redness or swelling, or any new or worse concerns.

## 2024-01-09 NOTE — Clinical Note
[facility] EMERGENCY ROOM  [address]  [place] [address]  Phone: [phone]    Irene Tabares was seen and treated in our emergency department on 1/8/2024.  She may return to work on 01/10/2024.         Thank you for choosing [facility].    Amy Zarate APRN

## 2024-01-09 NOTE — Clinical Note
[facility] EMERGENCY ROOM  [address]  [place] [address]  Phone: [phone]    Irene Tabares was seen and treated in our emergency department on 1/8/2024.  She may return to work on 01/11/2024.         Thank you for choosing [facility].    Amy Zarate APRN

## 2024-01-11 ENCOUNTER — PREP FOR SURGERY (OUTPATIENT)
Dept: OTHER | Facility: HOSPITAL | Age: 24
End: 2024-01-11
Payer: COMMERCIAL

## 2024-01-11 ENCOUNTER — OFFICE VISIT (OUTPATIENT)
Dept: SURGERY | Facility: CLINIC | Age: 24
End: 2024-01-11
Payer: COMMERCIAL

## 2024-01-11 VITALS
WEIGHT: 215 LBS | SYSTOLIC BLOOD PRESSURE: 112 MMHG | DIASTOLIC BLOOD PRESSURE: 62 MMHG | RESPIRATION RATE: 16 BRPM | BODY MASS INDEX: 38.09 KG/M2 | HEART RATE: 68 BPM | HEIGHT: 63 IN

## 2024-01-11 DIAGNOSIS — L73.2 HIDRADENITIS: Primary | ICD-10-CM

## 2024-01-11 RX ORDER — SODIUM CHLORIDE 0.9 % (FLUSH) 0.9 %
10 SYRINGE (ML) INJECTION EVERY 12 HOURS SCHEDULED
OUTPATIENT
Start: 2024-01-11

## 2024-01-11 RX ORDER — SPIRONOLACTONE 100 MG/1
1 TABLET, FILM COATED ORAL DAILY
COMMUNITY
Start: 2023-11-21

## 2024-01-11 RX ORDER — VANCOMYCIN/0.9 % SOD CHLORIDE 1.5G/250ML
15 PLASTIC BAG, INJECTION (ML) INTRAVENOUS ONCE
OUTPATIENT
Start: 2024-01-11 | End: 2024-01-11

## 2024-01-11 RX ORDER — SODIUM CHLORIDE 9 MG/ML
40 INJECTION, SOLUTION INTRAVENOUS AS NEEDED
OUTPATIENT
Start: 2024-01-11

## 2024-01-11 RX ORDER — SODIUM CHLORIDE 0.9 % (FLUSH) 0.9 %
10 SYRINGE (ML) INJECTION AS NEEDED
OUTPATIENT
Start: 2024-01-11

## 2024-01-11 NOTE — H&P (VIEW-ONLY)
Chief Complaint: Hidradenitis  ((R) Axilla ) and Cyst (Near  scar)    Subjective         History of Present Illness  Irene Tabares is a 23 y.o. female presents to [facility] GENERAL SURGERY. The patient is to be seen for hidradenitis suppurativa.    Hidradenitis suppurativa  The patient reports she has had hidradenitis suppurativa for a while. She states she has had 27 surgeries in the past 8 years. She notes she has scars on her vagina from the surgery. She states she has 2 spots. She notes she has a cyst that has been growing for the past 2 to 3 years. She states she was seen in the emergency room. She notes it is so sore that she can not roll her pants around it. She states she has been on antibiotics now. She notes she has been taking clindamycin 3 pills 3 times a day for 6 to 7 days. She states she has 3 more days left on it. She notes she has had 4 C-sections back to back. She states she has had 7 surgeries on her ear. She notes she sees an ENT specialist. She states he told her the next surgery would be to remove her earlobe, so she never went back. She notes she has seen a dermatologist in the past. She states she was put on metformin and another medication, but she does not think it is working. She notes she would like to try Humira. She states she tries to get in sooner, but she can not get her in sooner. She notes she is hoping she will put her on a Humira shot.    Objective     Past Medical History:   Diagnosis Date    Hidradenitis suppurativa     Hx of tubal ligation     Substance abuse        Past Surgical History:   Procedure Laterality Date    CYST REMOVAL      TUBAL ABDOMINAL LIGATION           Current Outpatient Medications:     clindamycin (CLEOCIN) 300 MG capsule, Take 1 capsule by mouth Every 12 (Twelve) Hours., Disp: , Rfl:     HYDROcodone-acetaminophen (NORCO) 5-325 MG per tablet, Take 1 tablet by mouth Every 6 (Six) Hours As Needed (Pain)., Disp: 12 tablet,  "Rfl: 0    HYDROcodone-acetaminophen (NORCO) 7.5-325 MG per tablet, Take 1 tablet by mouth Every 4 (Four) Hours As Needed for Moderate Pain., Disp: 15 tablet, Rfl: 0    metFORMIN ER (GLUCOPHAGE-XR) 500 MG 24 hr tablet, Take 1 tablet by mouth Daily., Disp: , Rfl:     mupirocin (BACTROBAN) 2 % ointment, Apply 1 application  topically to the appropriate area as directed 3 (Three) Times a Day., Disp: 30 g, Rfl: 0    spironolactone (ALDACTONE) 100 MG tablet, Take 1 tablet by mouth Daily., Disp: , Rfl:     Allergies   Allergen Reactions    Ciprofloxacin Anaphylaxis     Swelling of throat.    Latex Hives    Penicillins Anaphylaxis and Shortness Of Breath        History reviewed. No pertinent family history.    Social History     Socioeconomic History    Marital status: Single   Tobacco Use    Smoking status: Every Day     Packs/day: 1.00     Years: 10.00     Additional pack years: 0.00     Total pack years: 10.00     Types: Cigarettes    Smokeless tobacco: Never   Vaping Use    Vaping Use: Never used   Substance and Sexual Activity    Alcohol use: Not Currently    Drug use: Not Currently    Sexual activity: Yes       Vital Signs:   /62 (BP Location: Left arm, Patient Position: Sitting, Cuff Size: Adult)   Pulse 68   Resp 16   Ht 160 cm (63\")   Wt 97.5 kg (215 lb)   BMI 38.09 kg/m²      Physical Exam     Result Review :            Procedures        Assessment and Plan    There are no diagnoses linked to this encounter.    1. Patient with symptomatic hidradenitis and likely resolving infection.    Plan:  We will plan for excision of hidradenitis in the operating room with her most symptomatic lesions. Risks, benefits, alternatives of the procedure were discussed extensively. All questions were answered. Patient voiced understanding and agreed to proceed with the above plan. Additionally, she has a dermatologist that she has been seeing and I do think that the patient based on the description of her history and " current examination, could potentially be a candidate for Humira, but I will defer to dermatology for their recommendations.      Follow Up   No follow-ups on file.  Patient was given instructions and counseling regarding her condition or for health maintenance advice. Please see specific information pulled into the AVS if appropriate.       Transcribed from ambient dictation for Yuan Roman MD by Any Hernandes.  01/11/24   10:14 EST    Patient or patient representative verbalized consent to the visit recording.  I have personally performed the services described in this document as transcribed by the above individual, and it is both accurate and complete.

## 2024-01-11 NOTE — PROGRESS NOTES
Chief Complaint: Hidradenitis  ((R) Axilla ) and Cyst (Near  scar)    Subjective         History of Present Illness  Irene Tabares is a 23 y.o. female presents to [facility] GENERAL SURGERY. The patient is to be seen for hidradenitis suppurativa.    Hidradenitis suppurativa  The patient reports she has had hidradenitis suppurativa for a while. She states she has had 27 surgeries in the past 8 years. She notes she has scars on her vagina from the surgery. She states she has 2 spots. She notes she has a cyst that has been growing for the past 2 to 3 years. She states she was seen in the emergency room. She notes it is so sore that she can not roll her pants around it. She states she has been on antibiotics now. She notes she has been taking clindamycin 3 pills 3 times a day for 6 to 7 days. She states she has 3 more days left on it. She notes she has had 4 C-sections back to back. She states she has had 7 surgeries on her ear. She notes she sees an ENT specialist. She states he told her the next surgery would be to remove her earlobe, so she never went back. She notes she has seen a dermatologist in the past. She states she was put on metformin and another medication, but she does not think it is working. She notes she would like to try Humira. She states she tries to get in sooner, but she can not get her in sooner. She notes she is hoping she will put her on a Humira shot.    Objective     Past Medical History:   Diagnosis Date    Hidradenitis suppurativa     Hx of tubal ligation     Substance abuse        Past Surgical History:   Procedure Laterality Date    CYST REMOVAL      TUBAL ABDOMINAL LIGATION           Current Outpatient Medications:     clindamycin (CLEOCIN) 300 MG capsule, Take 1 capsule by mouth Every 12 (Twelve) Hours., Disp: , Rfl:     HYDROcodone-acetaminophen (NORCO) 5-325 MG per tablet, Take 1 tablet by mouth Every 6 (Six) Hours As Needed (Pain)., Disp: 12 tablet,  "Rfl: 0    HYDROcodone-acetaminophen (NORCO) 7.5-325 MG per tablet, Take 1 tablet by mouth Every 4 (Four) Hours As Needed for Moderate Pain., Disp: 15 tablet, Rfl: 0    metFORMIN ER (GLUCOPHAGE-XR) 500 MG 24 hr tablet, Take 1 tablet by mouth Daily., Disp: , Rfl:     mupirocin (BACTROBAN) 2 % ointment, Apply 1 application  topically to the appropriate area as directed 3 (Three) Times a Day., Disp: 30 g, Rfl: 0    spironolactone (ALDACTONE) 100 MG tablet, Take 1 tablet by mouth Daily., Disp: , Rfl:     Allergies   Allergen Reactions    Ciprofloxacin Anaphylaxis     Swelling of throat.    Latex Hives    Penicillins Anaphylaxis and Shortness Of Breath        History reviewed. No pertinent family history.    Social History     Socioeconomic History    Marital status: Single   Tobacco Use    Smoking status: Every Day     Packs/day: 1.00     Years: 10.00     Additional pack years: 0.00     Total pack years: 10.00     Types: Cigarettes    Smokeless tobacco: Never   Vaping Use    Vaping Use: Never used   Substance and Sexual Activity    Alcohol use: Not Currently    Drug use: Not Currently    Sexual activity: Yes       Vital Signs:   /62 (BP Location: Left arm, Patient Position: Sitting, Cuff Size: Adult)   Pulse 68   Resp 16   Ht 160 cm (63\")   Wt 97.5 kg (215 lb)   BMI 38.09 kg/m²      Physical Exam     Result Review :            Procedures        Assessment and Plan    There are no diagnoses linked to this encounter.    1. Patient with symptomatic hidradenitis and likely resolving infection.    Plan:  We will plan for excision of hidradenitis in the operating room with her most symptomatic lesions. Risks, benefits, alternatives of the procedure were discussed extensively. All questions were answered. Patient voiced understanding and agreed to proceed with the above plan. Additionally, she has a dermatologist that she has been seeing and I do think that the patient based on the description of her history and " current examination, could potentially be a candidate for Humira, but I will defer to dermatology for their recommendations.      Follow Up   No follow-ups on file.  Patient was given instructions and counseling regarding her condition or for health maintenance advice. Please see specific information pulled into the AVS if appropriate.       Transcribed from ambient dictation for Yuan Roman MD by Any Hernandes.  01/11/24   10:14 EST    Patient or patient representative verbalized consent to the visit recording.  I have personally performed the services described in this document as transcribed by the above individual, and it is both accurate and complete.

## 2024-01-17 ENCOUNTER — ANESTHESIA (OUTPATIENT)
Dept: PERIOP | Facility: HOSPITAL | Age: 24
End: 2024-01-17
Payer: COMMERCIAL

## 2024-01-17 ENCOUNTER — HOSPITAL ENCOUNTER (OUTPATIENT)
Facility: HOSPITAL | Age: 24
Setting detail: HOSPITAL OUTPATIENT SURGERY
Discharge: HOME | End: 2024-01-17
Attending: SURGERY | Admitting: SURGERY
Payer: COMMERCIAL

## 2024-01-17 ENCOUNTER — ANESTHESIA EVENT (OUTPATIENT)
Dept: PERIOP | Facility: HOSPITAL | Age: 24
End: 2024-01-17
Payer: COMMERCIAL

## 2024-01-17 VITALS
DIASTOLIC BLOOD PRESSURE: 88 MMHG | SYSTOLIC BLOOD PRESSURE: 142 MMHG | TEMPERATURE: 97.4 F | HEART RATE: 75 BPM | WEIGHT: 208.34 LBS | BODY MASS INDEX: 36.91 KG/M2 | RESPIRATION RATE: 16 BRPM | OXYGEN SATURATION: 98 % | HEIGHT: 63 IN

## 2024-01-17 DIAGNOSIS — L73.2 HIDRADENITIS: ICD-10-CM

## 2024-01-17 PROCEDURE — 25010000002 BUPIVACAINE (PF) 0.5 % SOLUTION 10 ML VIAL: Performed by: SURGERY

## 2024-01-17 PROCEDURE — 25010000002 ONDANSETRON PER 1 MG

## 2024-01-17 PROCEDURE — 25010000002 LIDOCAINE 1 % SOLUTION 20 ML VIAL: Performed by: SURGERY

## 2024-01-17 PROCEDURE — 11462 EXC SKN HDRDNT ING SMPL/NTRM: CPT | Performed by: SURGERY

## 2024-01-17 PROCEDURE — 25010000002 MIDAZOLAM PER 1MG: Performed by: ANESTHESIOLOGY

## 2024-01-17 PROCEDURE — 25010000002 HYDROMORPHONE 1 MG/ML SOLUTION

## 2024-01-17 PROCEDURE — 25010000002 GLYCOPYRROLATE 0.2 MG/ML SOLUTION

## 2024-01-17 PROCEDURE — 25010000002 PROPOFOL 10 MG/ML EMULSION

## 2024-01-17 PROCEDURE — 25010000002 MEPERIDINE PER 100 MG

## 2024-01-17 PROCEDURE — 25010000002 VANCOMYCIN 5 G RECONSTITUTED SOLUTION: Performed by: SURGERY

## 2024-01-17 PROCEDURE — 88304 TISSUE EXAM BY PATHOLOGIST: CPT | Performed by: SURGERY

## 2024-01-17 PROCEDURE — 25810000003 LACTATED RINGERS PER 1000 ML: Performed by: ANESTHESIOLOGY

## 2024-01-17 PROCEDURE — 25010000002 ESMOLOL 100 MG/10ML SOLUTION

## 2024-01-17 PROCEDURE — 0 HYDROMORPHONE 1 MG/ML SOLUTION

## 2024-01-17 PROCEDURE — 25010000002 FENTANYL CITRATE (PF) 50 MCG/ML SOLUTION

## 2024-01-17 PROCEDURE — 25010000002 DEXAMETHASONE PER 1 MG

## 2024-01-17 PROCEDURE — 11450 EXC SKN HDRDNT AX SMPL/NTRM: CPT | Performed by: SURGERY

## 2024-01-17 PROCEDURE — 25810000003 SODIUM CHLORIDE 0.9 % SOLUTION: Performed by: SURGERY

## 2024-01-17 RX ORDER — SODIUM CHLORIDE, SODIUM LACTATE, POTASSIUM CHLORIDE, CALCIUM CHLORIDE 600; 310; 30; 20 MG/100ML; MG/100ML; MG/100ML; MG/100ML
9 INJECTION, SOLUTION INTRAVENOUS CONTINUOUS PRN
Status: DISCONTINUED | OUTPATIENT
Start: 2024-01-17 | End: 2024-01-17 | Stop reason: HOSPADM

## 2024-01-17 RX ORDER — EPHEDRINE SULFATE 50 MG/ML
INJECTION, SOLUTION INTRAVENOUS AS NEEDED
Status: DISCONTINUED | OUTPATIENT
Start: 2024-01-17 | End: 2024-01-17 | Stop reason: SURG

## 2024-01-17 RX ORDER — FENTANYL CITRATE 50 UG/ML
INJECTION, SOLUTION INTRAMUSCULAR; INTRAVENOUS AS NEEDED
Status: DISCONTINUED | OUTPATIENT
Start: 2024-01-17 | End: 2024-01-17 | Stop reason: SURG

## 2024-01-17 RX ORDER — DEXAMETHASONE SODIUM PHOSPHATE 4 MG/ML
INJECTION, SOLUTION INTRA-ARTICULAR; INTRALESIONAL; INTRAMUSCULAR; INTRAVENOUS; SOFT TISSUE AS NEEDED
Status: DISCONTINUED | OUTPATIENT
Start: 2024-01-17 | End: 2024-01-17 | Stop reason: SURG

## 2024-01-17 RX ORDER — MAGNESIUM HYDROXIDE 1200 MG/15ML
LIQUID ORAL AS NEEDED
Status: DISCONTINUED | OUTPATIENT
Start: 2024-01-17 | End: 2024-01-17 | Stop reason: HOSPADM

## 2024-01-17 RX ORDER — PROMETHAZINE HYDROCHLORIDE 12.5 MG/1
25 TABLET ORAL ONCE AS NEEDED
Status: DISCONTINUED | OUTPATIENT
Start: 2024-01-17 | End: 2024-01-17 | Stop reason: HOSPADM

## 2024-01-17 RX ORDER — OXYCODONE HYDROCHLORIDE 5 MG/1
5 TABLET ORAL
Status: COMPLETED | OUTPATIENT
Start: 2024-01-17 | End: 2024-01-17

## 2024-01-17 RX ORDER — HYDROCODONE BITARTRATE AND ACETAMINOPHEN 5; 325 MG/1; MG/1
1 TABLET ORAL ONCE AS NEEDED
Status: DISCONTINUED | OUTPATIENT
Start: 2024-01-17 | End: 2024-01-17 | Stop reason: HOSPADM

## 2024-01-17 RX ORDER — ONDANSETRON 2 MG/ML
INJECTION INTRAMUSCULAR; INTRAVENOUS AS NEEDED
Status: DISCONTINUED | OUTPATIENT
Start: 2024-01-17 | End: 2024-01-17 | Stop reason: SURG

## 2024-01-17 RX ORDER — GLYCOPYRROLATE 0.2 MG/ML
INJECTION INTRAMUSCULAR; INTRAVENOUS AS NEEDED
Status: DISCONTINUED | OUTPATIENT
Start: 2024-01-17 | End: 2024-01-17 | Stop reason: SURG

## 2024-01-17 RX ORDER — SODIUM CHLORIDE 0.9 % (FLUSH) 0.9 %
10 SYRINGE (ML) INJECTION AS NEEDED
Status: DISCONTINUED | OUTPATIENT
Start: 2024-01-17 | End: 2024-01-17 | Stop reason: HOSPADM

## 2024-01-17 RX ORDER — MIDAZOLAM HYDROCHLORIDE 2 MG/2ML
2 INJECTION, SOLUTION INTRAMUSCULAR; INTRAVENOUS ONCE
Status: COMPLETED | OUTPATIENT
Start: 2024-01-17 | End: 2024-01-17

## 2024-01-17 RX ORDER — LIDOCAINE HYDROCHLORIDE 20 MG/ML
INJECTION, SOLUTION EPIDURAL; INFILTRATION; INTRACAUDAL; PERINEURAL AS NEEDED
Status: DISCONTINUED | OUTPATIENT
Start: 2024-01-17 | End: 2024-01-17 | Stop reason: SURG

## 2024-01-17 RX ORDER — ONDANSETRON 2 MG/ML
4 INJECTION INTRAMUSCULAR; INTRAVENOUS ONCE AS NEEDED
Status: DISCONTINUED | OUTPATIENT
Start: 2024-01-17 | End: 2024-01-17 | Stop reason: HOSPADM

## 2024-01-17 RX ORDER — ESMOLOL HYDROCHLORIDE 10 MG/ML
INJECTION INTRAVENOUS AS NEEDED
Status: DISCONTINUED | OUTPATIENT
Start: 2024-01-17 | End: 2024-01-17 | Stop reason: SURG

## 2024-01-17 RX ORDER — NALOXONE HYDROCHLORIDE 4 MG/.1ML
SPRAY NASAL
Qty: 2 EACH | Refills: 0 | Status: SHIPPED | OUTPATIENT
Start: 2024-01-17

## 2024-01-17 RX ORDER — SODIUM CHLORIDE 9 MG/ML
40 INJECTION, SOLUTION INTRAVENOUS AS NEEDED
Status: DISCONTINUED | OUTPATIENT
Start: 2024-01-17 | End: 2024-01-17 | Stop reason: HOSPADM

## 2024-01-17 RX ORDER — ACETAMINOPHEN 500 MG
1000 TABLET ORAL ONCE
Status: COMPLETED | OUTPATIENT
Start: 2024-01-17 | End: 2024-01-17

## 2024-01-17 RX ORDER — VANCOMYCIN/0.9 % SOD CHLORIDE 1.5G/250ML
15 PLASTIC BAG, INJECTION (ML) INTRAVENOUS ONCE
Status: COMPLETED | OUTPATIENT
Start: 2024-01-17 | End: 2024-01-17

## 2024-01-17 RX ORDER — PROPOFOL 10 MG/ML
VIAL (ML) INTRAVENOUS AS NEEDED
Status: DISCONTINUED | OUTPATIENT
Start: 2024-01-17 | End: 2024-01-17 | Stop reason: SURG

## 2024-01-17 RX ORDER — SODIUM CHLORIDE 0.9 % (FLUSH) 0.9 %
10 SYRINGE (ML) INJECTION EVERY 12 HOURS SCHEDULED
Status: DISCONTINUED | OUTPATIENT
Start: 2024-01-17 | End: 2024-01-17 | Stop reason: HOSPADM

## 2024-01-17 RX ORDER — DOCUSATE SODIUM 100 MG/1
100 CAPSULE, LIQUID FILLED ORAL 2 TIMES DAILY PRN
Qty: 10 CAPSULE | Refills: 1 | Status: SHIPPED | OUTPATIENT
Start: 2024-01-17 | End: 2024-01-28

## 2024-01-17 RX ORDER — MEPERIDINE HYDROCHLORIDE 25 MG/ML
12.5 INJECTION INTRAMUSCULAR; INTRAVENOUS; SUBCUTANEOUS
Status: DISCONTINUED | OUTPATIENT
Start: 2024-01-17 | End: 2024-01-17 | Stop reason: HOSPADM

## 2024-01-17 RX ORDER — HYDROCODONE BITARTRATE AND ACETAMINOPHEN 5; 325 MG/1; MG/1
1-2 TABLET ORAL EVERY 4 HOURS PRN
Qty: 20 TABLET | Refills: 0 | Status: SHIPPED | OUTPATIENT
Start: 2024-01-17 | End: 2024-01-28

## 2024-01-17 RX ORDER — PROMETHAZINE HYDROCHLORIDE 25 MG/1
25 SUPPOSITORY RECTAL ONCE AS NEEDED
Status: DISCONTINUED | OUTPATIENT
Start: 2024-01-17 | End: 2024-01-17 | Stop reason: HOSPADM

## 2024-01-17 RX ORDER — DEXMEDETOMIDINE HYDROCHLORIDE 100 UG/ML
INJECTION, SOLUTION INTRAVENOUS AS NEEDED
Status: DISCONTINUED | OUTPATIENT
Start: 2024-01-17 | End: 2024-01-17 | Stop reason: SURG

## 2024-01-17 RX ADMIN — HYDROMORPHONE HYDROCHLORIDE 0.5 MG: 1 INJECTION, SOLUTION INTRAMUSCULAR; INTRAVENOUS; SUBCUTANEOUS at 13:41

## 2024-01-17 RX ADMIN — Medication 1500 MG: at 13:04

## 2024-01-17 RX ADMIN — MEPERIDINE HYDROCHLORIDE 12.5 MG: 25 INJECTION INTRAMUSCULAR; INTRAVENOUS; SUBCUTANEOUS at 14:19

## 2024-01-17 RX ADMIN — HYDROMORPHONE HYDROCHLORIDE 0.5 MG: 1 INJECTION, SOLUTION INTRAMUSCULAR; INTRAVENOUS; SUBCUTANEOUS at 14:29

## 2024-01-17 RX ADMIN — SODIUM CHLORIDE, POTASSIUM CHLORIDE, SODIUM LACTATE AND CALCIUM CHLORIDE 9 ML/HR: 600; 310; 30; 20 INJECTION, SOLUTION INTRAVENOUS at 12:41

## 2024-01-17 RX ADMIN — DEXMEDETOMIDINE HYDROCHLORIDE 10 MCG: 100 INJECTION, SOLUTION, CONCENTRATE INTRAVENOUS at 13:30

## 2024-01-17 RX ADMIN — ACETAMINOPHEN 1000 MG: 500 TABLET ORAL at 12:40

## 2024-01-17 RX ADMIN — ESMOLOL HYDROCHLORIDE 10 MG: 100 INJECTION, SOLUTION INTRAVENOUS at 13:38

## 2024-01-17 RX ADMIN — FENTANYL CITRATE 50 MCG: 50 INJECTION, SOLUTION INTRAMUSCULAR; INTRAVENOUS at 13:09

## 2024-01-17 RX ADMIN — DEXMEDETOMIDINE HYDROCHLORIDE 10 MCG: 100 INJECTION, SOLUTION, CONCENTRATE INTRAVENOUS at 13:38

## 2024-01-17 RX ADMIN — OXYCODONE 5 MG: 5 TABLET ORAL at 14:34

## 2024-01-17 RX ADMIN — PROPOFOL 200 MG: 10 INJECTION, EMULSION INTRAVENOUS at 12:57

## 2024-01-17 RX ADMIN — GLYCOPYRROLATE 0.2 MG: 0.2 INJECTION INTRAMUSCULAR; INTRAVENOUS at 13:13

## 2024-01-17 RX ADMIN — EPHEDRINE SULFATE 10 MG: 50 INJECTION INTRAVENOUS at 13:54

## 2024-01-17 RX ADMIN — HYDROMORPHONE HYDROCHLORIDE 0.5 MG: 1 INJECTION, SOLUTION INTRAMUSCULAR; INTRAVENOUS; SUBCUTANEOUS at 14:18

## 2024-01-17 RX ADMIN — DEXAMETHASONE SODIUM PHOSPHATE 4 MG: 4 INJECTION, SOLUTION INTRAMUSCULAR; INTRAVENOUS at 13:01

## 2024-01-17 RX ADMIN — EPHEDRINE SULFATE 10 MG: 50 INJECTION INTRAVENOUS at 13:18

## 2024-01-17 RX ADMIN — HYDROMORPHONE HYDROCHLORIDE 0.25 MG: 1 INJECTION, SOLUTION INTRAMUSCULAR; INTRAVENOUS; SUBCUTANEOUS at 14:31

## 2024-01-17 RX ADMIN — FENTANYL CITRATE 50 MCG: 50 INJECTION, SOLUTION INTRAMUSCULAR; INTRAVENOUS at 12:57

## 2024-01-17 RX ADMIN — LIDOCAINE HYDROCHLORIDE 50 MG: 20 INJECTION, SOLUTION EPIDURAL; INFILTRATION; INTRACAUDAL; PERINEURAL at 12:57

## 2024-01-17 RX ADMIN — OXYCODONE 5 MG: 5 TABLET ORAL at 15:06

## 2024-01-17 RX ADMIN — MIDAZOLAM HYDROCHLORIDE 2 MG: 1 INJECTION, SOLUTION INTRAMUSCULAR; INTRAVENOUS at 12:40

## 2024-01-17 RX ADMIN — ONDANSETRON 4 MG: 2 INJECTION INTRAMUSCULAR; INTRAVENOUS at 13:01

## 2024-01-17 NOTE — OP NOTE
Preoperative diagnosis: Hidradenitis    Postoperative diagnosis: Same    Findings:  Areas of hidradenitis in the suprapubic region which measured 2 x 3 cm and 3 x 6 cm respectively    Areas of hidradenitis in the right axilla which measured 3 x 3 cm and 4 x 6 cm respectively    Procedure: Excision of suprapubic and axillary hidradenitis    Surgeon: Abel    Anesthesia: General    Assistant: Lenore Lorenz    EBL: 11 mL    Specimens: Suprapubic hidradenitis and right axillary hidradenitis    Complications: None    Indications: 23-year-old female with history of chronic hidradenitis.  Presents with painful and enlarging and draining lesions in her right axilla and suprapubic region.  Was taken today for elective excision.    PROCEDURE    Patient was seen in the preoperative holding area.  Patient was marked while she was awake and alert and the areas that were marked were the areas that were operated upon.  Risks, benefits, alternatives of the operation were again discussed in detail.  All of the patient and family's questions were answered.  They voiced understanding, and agreed to proceed.    Patient was brought to the operating room.  Monitoring devices and Neo stockings were placed.  Anesthesia was administered.  Patient was prepped and draped in standard surgical fashion.  After prepping and draping a timeout was performed to verify both the correct patient and correct procedure.    Each area of excision was the same, we began by injecting local anesthesia around the area of symptomatic enlarging and draining hidradenitis.  We started first in the suprapubic region she had an area just above the pubic bone and 1 over on the right side.  After injection of local anesthesia we made an elliptical incision around the areas of hidradenitis.  We carried our dissection down with a combination of sharp dissection and electrocautery.  We dissected until we found normal healthy appearing fatty tissue.  We amputated the  lesions and passed them off for pathology.  We copiously irrigated the wound bed.  Penrose drain was placed in the wound bed.  We did not place a Penrose drain in the suprapubic region.  The wounds were closed deeply with 3-0 Vicryl sutures and the skin was closed with interrupted 3-0 nylon sutures.  The Penrose drain was sewn into place with 3-0 nylon.    We then turned our attention to the right axillary region.  Again,we began by injecting local anesthesia around the area of symptomatic enlarging and draining hidradenitis.  We started first in the upper right axillary region region and she had 1 in the lateral right axillary region as well.  After injection of local anesthesia we made an elliptical incision around the areas of hidradenitis.  We carried our dissection down with a combination of sharp dissection and electrocautery.  We dissected until we found normal healthy appearing fatty tissue.  We amputated the lesions and passed them off for pathology.  We copiously irrigated the wound bed.  Penrose drain was placed in each wound bed.  The wounds were closed deeply with 3-0 Vicryl sutures and the skin was closed with interrupted 3-0 nylon sutures.  The Penrose drain was sewn into place with 3-0 nylon.    Wounds were dressed with Adaptic 4 x 4's and tape.    The patient was then aroused from anesthesia, and taken off the OR table, and taken to PACU in stable condition.  Sponge, needle, and instrument counts were correct x2.  Lenore Lorenz was present for the entire procedure and helped in all portions of the case.    Assistant: Lenore Lorenz CSA was responsible for performing the following activities: Suction, Irrigation, Suturing, Closing, and Placing Dressing and their skilled assistance was necessary for the success of this case.      The operative note was dictated with the help of the dragon dictation system.

## 2024-01-17 NOTE — ANESTHESIA PREPROCEDURE EVALUATION
Anesthesia Evaluation     Patient summary reviewed and Nursing notes reviewed   NPO Solid Status: > 8 hours  NPO Liquid Status: > 8 hours           Airway   Mallampati: I  TM distance: >3 FB  Neck ROM: full  Dental    (+) partials    Pulmonary - normal exam   (+) a smoker Current,  Cardiovascular - negative cardio ROS and normal exam  Exercise tolerance: good (4-7 METS)        Neuro/Psych- negative ROS  GI/Hepatic/Renal/Endo    (+) obesity    Musculoskeletal (-) negative ROS    Abdominal   (+) obese   Substance History - negative use     OB/GYN negative ob/gyn ROS         Other - negative ROS                         Anesthesia Plan    ASA 2     general     intravenous induction     Anesthetic plan, risks, benefits, and alternatives have been provided, discussed and informed consent has been obtained with: patient.    Plan discussed with CRNA.        CODE STATUS:

## 2024-01-17 NOTE — DISCHARGE INSTRUCTIONS
DISCHARGE INSTRUCTIONS  SURGICAL / AMBULATORY  PROCEDURES      For your surgery you had:  Local anesthesia  Monitored anesthesia Care  You may experience dizziness, drowsiness, or light-headedness for several hours following surgery/procedure.  Do not stay alone today or tonight.  Limit your activity for 24 hours.  Resume your diet slowly.  Follow whatever special dietary instructions you may have been given by your doctor.  You should not drive or operate machinery, drink alcohol, or sign legally binding documents for 24 hours or while you are taking pain medication.    NOTIFY YOUR DOCTOR IF YOU EXPERIENCE ANY OF THE FOLLOWING:  Temperature greater than 101 degrees Fahrenheit  Shaking Chills  Redness or excessive drainage from incision  Nausea, vomiting and/or pain that is not controlled by prescribed medications  Increase in bleeding or bleeding that is excessive  Unable to urinate in 6 hours after surgery  If unable to reach your doctor, please go to the closest Emergency Room    SPECIAL INSTRUCTIONS:  May shower tomorrow.    No tub bathing, swimming, or submerging incisions for at least 2 weeks.   Keep dressings covered to catch drainage from the Penrose drains.    Can change dressings as often as needed.  No lifting greater than 15 pounds or strenuous activity for 2 weeks.    No driving if taking narcotic pain medication.     Last dose of pain medication was given at:   Tylenol (1000mg) last at 12:40pm. Do not exceed 4000mg of tylenol in a 24 hour period.  Oxycodone 10mg last at 3pm. May take norco next at 7pm if needed.  May take ibuprofen next at anytime if able and needed.

## 2024-01-17 NOTE — ANESTHESIA POSTPROCEDURE EVALUATION
Patient: Irene Tabares    Procedure Summary       Date: 01/17/24 Room / Location: [facility] OR 05 / [facility] MAIN OR    Anesthesia Start: 1254 Anesthesia Stop: 1411    Procedure: Excision of axillary and groin hidradenitis (Groin) Diagnosis:       Hidradenitis      (Hidradenitis [L73.2])    Surgeons: Yuan Roman MD Provider: Eduardo Trinidad CRNA    Anesthesia Type: general ASA Status: 2            Anesthesia Type: general    Vitals  Vitals Value Taken Time   /70 01/17/24 1420   Temp     Pulse 103 01/17/24 1425   Resp     SpO2 95 % 01/17/24 1425   Vitals shown include unfiled device data.        Post Anesthesia Care and Evaluation    Patient location during evaluation: bedside  Patient participation: complete - patient participated  Level of consciousness: awake  Pain score: 2  Pain management: adequate    Airway patency: patent  PONV Status: none  Cardiovascular status: acceptable and stable  Respiratory status: acceptable  Hydration status: acceptable    Comments: An Anesthesiologist personally participated in the most demanding procedures (including induction and emergence if applicable) in the anesthesia plan, monitored the course of anesthesia administration at frequent intervals and remained physically present and available for immediate diagnosis and treatment of emergencies.

## 2024-01-19 LAB
CYTO UR: NORMAL
LAB AP CASE REPORT: NORMAL
LAB AP CLINICAL INFORMATION: NORMAL
PATH REPORT.FINAL DX SPEC: NORMAL
PATH REPORT.GROSS SPEC: NORMAL

## 2024-01-22 ENCOUNTER — TELEPHONE (OUTPATIENT)
Dept: SURGERY | Facility: CLINIC | Age: 24
End: 2024-01-22
Payer: COMMERCIAL

## 2024-01-22 DIAGNOSIS — L73.2 HIDRADENITIS: ICD-10-CM

## 2024-01-22 RX ORDER — SULFAMETHOXAZOLE AND TRIMETHOPRIM 800; 160 MG/1; MG/1
1 TABLET ORAL 2 TIMES DAILY
Qty: 12 TABLET | Refills: 0 | Status: SHIPPED | OUTPATIENT
Start: 2024-01-22

## 2024-01-22 RX ORDER — HYDROCODONE BITARTRATE AND ACETAMINOPHEN 5; 325 MG/1; MG/1
1-2 TABLET ORAL EVERY 4 HOURS PRN
Qty: 20 TABLET | Refills: 0 | Status: SHIPPED | OUTPATIENT
Start: 2024-01-22 | End: 2024-01-28

## 2024-01-22 NOTE — TELEPHONE ENCOUNTER
PER DR LUCERO IF THE DRAIN HAS CAME OUT, IT CAN NOT BE PUT BACK IN. THE BRUISING IS NORMAL. HE HAS CALLED HER IN A REFILL ON PAIN MEDICATION , AN ANTIBIOTIC AND SHE NEEDS TO KEEP HER APPOINTMENT ON WED. PATIENT HAS BEEN NOTIFIED.

## 2024-01-25 ENCOUNTER — OFFICE VISIT (OUTPATIENT)
Dept: SURGERY | Facility: CLINIC | Age: 24
End: 2024-01-25
Payer: COMMERCIAL

## 2024-01-25 VITALS
SYSTOLIC BLOOD PRESSURE: 176 MMHG | HEIGHT: 63 IN | DIASTOLIC BLOOD PRESSURE: 81 MMHG | BODY MASS INDEX: 37.21 KG/M2 | HEART RATE: 83 BPM | WEIGHT: 210 LBS | RESPIRATION RATE: 16 BRPM

## 2024-01-25 DIAGNOSIS — L73.2 HIDRADENITIS: Primary | ICD-10-CM

## 2024-01-25 PROCEDURE — 99024 POSTOP FOLLOW-UP VISIT: CPT | Performed by: SURGERY

## 2024-01-25 PROCEDURE — 1160F RVW MEDS BY RX/DR IN RCRD: CPT | Performed by: SURGERY

## 2024-01-25 PROCEDURE — 1159F MED LIST DOCD IN RCRD: CPT | Performed by: SURGERY
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
## 2024-01-28 ENCOUNTER — HOSPITAL ENCOUNTER (EMERGENCY)
Facility: HOSPITAL | Age: 24
Discharge: HOME | End: 2024-01-28
Attending: EMERGENCY MEDICINE | Admitting: EMERGENCY MEDICINE
Payer: COMMERCIAL

## 2024-01-28 VITALS
DIASTOLIC BLOOD PRESSURE: 69 MMHG | HEIGHT: 63 IN | OXYGEN SATURATION: 98 % | HEART RATE: 83 BPM | WEIGHT: 211.2 LBS | RESPIRATION RATE: 18 BRPM | SYSTOLIC BLOOD PRESSURE: 121 MMHG | TEMPERATURE: 98.3 F | BODY MASS INDEX: 37.42 KG/M2

## 2024-01-28 DIAGNOSIS — S31.109A RIGHT GROIN WOUND, INITIAL ENCOUNTER: Primary | ICD-10-CM

## 2024-01-28 DIAGNOSIS — L73.2 HIDRADENITIS: ICD-10-CM

## 2024-01-28 DIAGNOSIS — G89.18 POSTOPERATIVE PAIN: ICD-10-CM

## 2024-01-28 LAB
BASOPHILS # BLD AUTO: 0.04 10*3/MM3 (ref 0–0.2)
BASOPHILS NFR BLD AUTO: 0.4 % (ref 0–1.5)
DEPRECATED RDW RBC AUTO: 43.9 FL (ref 37–54)
EOSINOPHIL # BLD AUTO: 0.13 10*3/MM3 (ref 0–0.4)
EOSINOPHIL NFR BLD AUTO: 1.3 % (ref 0.3–6.2)
ERYTHROCYTE [DISTWIDTH] IN BLOOD BY AUTOMATED COUNT: 12.9 % (ref 12.3–15.4)
HCT VFR BLD AUTO: 41.8 % (ref 34–46.6)
HGB BLD-MCNC: 13.6 G/DL (ref 12–15.9)
IMM GRANULOCYTES # BLD AUTO: 0.03 10*3/MM3 (ref 0–0.05)
IMM GRANULOCYTES NFR BLD AUTO: 0.3 % (ref 0–0.5)
LYMPHOCYTES # BLD AUTO: 2.04 10*3/MM3 (ref 0.7–3.1)
LYMPHOCYTES NFR BLD AUTO: 19.7 % (ref 19.6–45.3)
MCH RBC QN AUTO: 30.2 PG (ref 26.6–33)
MCHC RBC AUTO-ENTMCNC: 32.5 G/DL (ref 31.5–35.7)
MCV RBC AUTO: 92.9 FL (ref 79–97)
MONOCYTES # BLD AUTO: 0.42 10*3/MM3 (ref 0.1–0.9)
MONOCYTES NFR BLD AUTO: 4.1 % (ref 5–12)
NEUTROPHILS NFR BLD AUTO: 7.71 10*3/MM3 (ref 1.7–7)
NEUTROPHILS NFR BLD AUTO: 74.2 % (ref 42.7–76)
NRBC BLD AUTO-RTO: 0 /100 WBC (ref 0–0.2)
PLATELET # BLD AUTO: 209 10*3/MM3 (ref 140–450)
PMV BLD AUTO: 11.4 FL (ref 6–12)
RBC # BLD AUTO: 4.5 10*6/MM3 (ref 3.77–5.28)
WBC NRBC COR # BLD AUTO: 10.37 10*3/MM3 (ref 3.4–10.8)

## 2024-01-28 PROCEDURE — 25010000002 MORPHINE PER 10 MG: Performed by: EMERGENCY MEDICINE

## 2024-01-28 PROCEDURE — 99283 EMERGENCY DEPT VISIT LOW MDM: CPT

## 2024-01-28 PROCEDURE — 85025 COMPLETE CBC W/AUTO DIFF WBC: CPT | Performed by: EMERGENCY MEDICINE

## 2024-01-28 PROCEDURE — 96374 THER/PROPH/DIAG INJ IV PUSH: CPT

## 2024-01-28 RX ORDER — HYDROCODONE BITARTRATE AND ACETAMINOPHEN 5; 325 MG/1; MG/1
1 TABLET ORAL EVERY 6 HOURS PRN
Qty: 15 TABLET | Refills: 0 | Status: SHIPPED | OUTPATIENT
Start: 2024-01-28

## 2024-01-28 RX ADMIN — MORPHINE SULFATE 4 MG: 4 INJECTION, SOLUTION INTRAMUSCULAR; INTRAVENOUS at 16:08

## 2024-01-28 NOTE — ED PROVIDER NOTES
Time: 4:04 PM EST  Date of encounter:  2024  Independent Historian/Clinical History and Information was obtained by:   Patient    History is limited by: N/A    Chief Complaint: Postop pain      History of Present Illness:  Patient is a 23 y.o. year old female who presents to the emergency department for evaluation of postoperative pain.  Patient states that she has a history of hidradenitis and had surgery on this in her right armpit as well as in her pubic area.  States that she was doing pretty well but over the last few days she started having significant pain in her suprapubic region.  States that is not draining and she has had worsening pain.  Currently on antibiotics per the patient.  She states that she spoke with her surgeon who recommended she come get evaluated.    HPI    Patient Care Team  Primary Care Provider: Russ Cobb APRN    Past Medical History:     Allergies   Allergen Reactions    Ciprofloxacin Anaphylaxis     Swelling of throat.    Latex Hives    Penicillins Anaphylaxis and Shortness Of Breath     Past Medical History:   Diagnosis Date    Hidradenitis suppurativa     Hx of tubal ligation     Substance abuse      Past Surgical History:   Procedure Laterality Date     SECTION      x4    CYST REMOVAL      EXCISION LESION N/A 2024    Procedure: Excision of axillary and groin hidradenitis;  Surgeon: Yuan Roman MD;  Location: [facility] OR;  Service: General;  Laterality: N/A;    TUBAL ABDOMINAL LIGATION       History reviewed. No pertinent family history.    Home Medications:  Prior to Admission medications    Medication Sig Start Date End Date Taking? Authorizing Provider   clindamycin (CLEOCIN) 300 MG capsule Take 1 capsule by mouth Every 12 (Twelve) Hours. 23   ProviderReyna MD   metFORMIN ER (GLUCOPHAGE-XR) 500 MG 24 hr tablet Take 1 tablet by mouth Daily.    ProviderReyna MD   naloxone (NARCAN) 4 MG/0.1ML nasal spray Call 911. Don't prime.  "Spray in 1 nostril for overdose. Repeat in 2-3 minutes in other nostril if no or minimal breathing/responsiveness. 1/17/24   Yuan Roman MD   spironolactone (ALDACTONE) 100 MG tablet Take 1 tablet by mouth Daily. 11/21/23   ProviderReyna MD   sulfamethoxazole-trimethoprim (Bactrim DS) 800-160 MG per tablet Take 1 tablet by mouth 2 (Two) Times a Day. 1/22/24   Yuan Roman MD   docusate sodium (Colace) 100 MG capsule Take 1 capsule by mouth 2 (Two) Times a Day As Needed for Constipation.  Patient not taking: Reported on 1/25/2024 1/17/24 1/28/24  Yuan Roman MD   HYDROcodone-acetaminophen (NORCO) 5-325 MG per tablet Take 1-2 tablets by mouth Every 4 (Four) Hours As Needed (Pain). 1/17/24 1/28/24  Yuan Roman MD   HYDROcodone-acetaminophen (Norco) 5-325 MG per tablet Take 1-2 tablets by mouth Every 4 (Four) Hours As Needed for Moderate Pain. 1/22/24 1/28/24  Yuan Romna MD   HYDROcodone-acetaminophen (NORCO) 7.5-325 MG per tablet Take 1 tablet by mouth Every 4 (Four) Hours As Needed for Moderate Pain. 1/9/24 1/28/24  Humaira Wilkins MD        Social History:   Social History     Tobacco Use    Smoking status: Every Day     Packs/day: 1.00     Years: 10.00     Additional pack years: 0.00     Total pack years: 10.00     Types: Cigarettes    Smokeless tobacco: Never    Tobacco comments:     Smoked last yesterday   Vaping Use    Vaping Use: Some days   Substance Use Topics    Alcohol use: Not Currently    Drug use: Not Currently         Review of Systems:  Review of Systems   Skin:  Positive for wound.        Physical Exam:  /69 (BP Location: Left arm, Patient Position: Sitting)   Pulse 83   Temp 98.3 °F (36.8 °C) (Oral)   Resp 18   Ht 160 cm (63\")   Wt 95.8 kg (211 lb 3.2 oz)   LMP 01/16/2024   SpO2 98%   BMI 37.41 kg/m²     Physical Exam  Vitals and nursing note reviewed.   Constitutional:       Appearance: Normal appearance.   HENT:      Head: Normocephalic " and atraumatic.   Eyes:      General: No scleral icterus.  Cardiovascular:      Rate and Rhythm: Normal rate and regular rhythm.      Heart sounds: Normal heart sounds.   Pulmonary:      Effort: Pulmonary effort is normal.      Breath sounds: Normal breath sounds.   Abdominal:      Palpations: Abdomen is soft.      Tenderness: There is no abdominal tenderness.   Musculoskeletal:         General: Normal range of motion.      Cervical back: Normal range of motion.   Skin:     Findings: No rash.      Comments: There is a well-healing incision and sutures noted with a drain in place to the right axilla.  There is no significant tenderness noted.  There is also a suprapubic sutures and drain tube noted with some surrounding erythema but no significant cellulitis noted.  There is tenderness to palpation noted.  No significant drainage noted.   Neurological:      General: No focal deficit present.      Mental Status: She is alert.                  Procedures:  Procedures      Medical Decision Making:      Comorbidities that affect care:    Hidradenitis    External Notes reviewed:    Reviewed note from 1/25/2024      The following orders were placed and all results were independently analyzed by me:  Orders Placed This Encounter   Procedures    Comprehensive Metabolic Panel    CBC Auto Differential    IP Consult to General Surgery    CBC & Differential       Medications Given in the Emergency Department:  Medications   morphine injection 4 mg (4 mg Intravenous Given 1/28/24 1608)        ED Course:         Labs:    Lab Results (last 24 hours)       Procedure Component Value Units Date/Time    Comprehensive Metabolic Panel [[id number]] Updated: 01/28/24 1647    Specimen: Blood     CBC & Differential [[id number]]  (Abnormal) Collected: 01/28/24 1609    Specimen: Blood Updated: 01/28/24 1618    Narrative:      The following orders were created for panel order CBC & Differential.  Procedure                                Abnormality         Status                     ---------                               -----------         ------                     CBC Auto Differential[[id number]]        Abnormal            Final result                 Please view results for these tests on the individual orders.    CBC Auto Differential [[id number]]  (Abnormal) Collected: 01/28/24 1609    Specimen: Blood Updated: 01/28/24 1618     WBC 10.37 10*3/mm3      RBC 4.50 10*6/mm3      Hemoglobin 13.6 g/dL      Hematocrit 41.8 %      MCV 92.9 fL      MCH 30.2 pg      MCHC 32.5 g/dL      RDW 12.9 %      RDW-SD 43.9 fl      MPV 11.4 fL      Platelets 209 10*3/mm3      Neutrophil % 74.2 %      Lymphocyte % 19.7 %      Monocyte % 4.1 %      Eosinophil % 1.3 %      Basophil % 0.4 %      Immature Grans % 0.3 %      Neutrophils, Absolute 7.71 10*3/mm3      Lymphocytes, Absolute 2.04 10*3/mm3      Monocytes, Absolute 0.42 10*3/mm3      Eosinophils, Absolute 0.13 10*3/mm3      Basophils, Absolute 0.04 10*3/mm3      Immature Grans, Absolute 0.03 10*3/mm3      nRBC 0.0 /100 WBC              Imaging:    No Radiology Exams Resulted Within Past 24 Hours      Differential Diagnosis and Discussion:    Abscess: Differential diagnosis for an abscess includes but is not limited to bacterial or fungal infections, foreign body reactions, malignancies, and autoimmune or inflammatory conditions.    All labs were reviewed and interpreted by me.    MDM     Amount and/or Complexity of Data Reviewed  Clinical lab tests: reviewed       Patient is a 23-year-old female who presents with complaints of right groin pain.  Had hidradenitis surgery about 1 week ago.  States that she was doing okay but started having worsening pain over the last couple days.  Was recently placed on doxycycline.  On exam does not have significant swelling or erythema noted and her right groin.  She has an active drain noted and sutures in place.  I did speak with surgery who recommended antibiotics which  the patient is already on as well as some pain medicine for couple days and to be seen tomorrow in the office.  Will DC.          Patient Care Considerations:          Consultants/Shared Management Plan:    Consultant: I have discussed the case with Dr. Walker who states recommends the patient needs    Social Determinants of Health:    Patient is independent, reliable, and has access to care.       Disposition and Care Coordination:    Discharged: The patient is suitable and stable for discharge with no need for consideration of admission.    I have explained the patient´s condition, diagnoses and treatment plan based on the information available to me at this time. I have answered questions and addressed any concerns. The patient has a good  understanding of the patient´s diagnosis, condition, and treatment plan as can be expected at this point. The vital signs have been stable. The patient´s condition is stable and appropriate for discharge from the emergency department.      The patient will pursue further outpatient evaluation with the primary care physician or other designated or consulting physician as outlined in the discharge instructions. They are agreeable to this plan of care and follow-up instructions have been explained in detail. The patient has received these instructions in written format and have expressed an understanding of the discharge instructions. The patient is aware that any significant change in condition or worsening of symptoms should prompt an immediate return to this or the closest emergency department or call to 911.      Final diagnoses:   Postoperative pain   Right groin wound, initial encounter   Hidradenitis        ED Disposition       ED Disposition   Discharge    Condition   Stable    Comment   --               This medical record created using voice recognition software.             Lui Rivas MD  01/28/24 8938

## 2024-01-29 ENCOUNTER — OFFICE VISIT (OUTPATIENT)
Dept: SURGERY | Facility: CLINIC | Age: 24
End: 2024-01-29
Payer: COMMERCIAL

## 2024-01-29 ENCOUNTER — TELEPHONE (OUTPATIENT)
Dept: SURGERY | Facility: CLINIC | Age: 24
End: 2024-01-29
Payer: COMMERCIAL

## 2024-01-29 VITALS — BODY MASS INDEX: 37.21 KG/M2 | HEIGHT: 63 IN | WEIGHT: 210 LBS

## 2024-01-29 DIAGNOSIS — Z09 ENCOUNTER FOR FOLLOW-UP: Primary | ICD-10-CM

## 2024-01-29 PROCEDURE — 1159F MED LIST DOCD IN RCRD: CPT | Performed by: SURGERY

## 2024-01-29 PROCEDURE — 1160F RVW MEDS BY RX/DR IN RCRD: CPT | Performed by: SURGERY

## 2024-01-29 PROCEDURE — 99024 POSTOP FOLLOW-UP VISIT: CPT | Performed by: SURGERY

## 2024-01-29 NOTE — LETTER
Patient: Irene Tabares   YOB: 2000   Date of Visit: 1/29/2024     See looks fine MARCELA.  See my note below from today.      Franklin Longo MD  01/29/24 5072  Sign when Signing Visit  Patient requested to be seen in the surgery clinic today to have her surgical wounds evaluated.  She is about 2 weeks status post excision of skin and subcutaneous tissue at her right axilla and suprapubic area for hidradenitis.  [place] drains were placed at both areas.  She said she went to the urgent care center last night because the suprapubic area was tender and red.  She says the provider there told her the suprapubic drain needed to be removed immediately.  No fevers.  Minimal drainage from both of the wounds.  She says the wound looks better today than it did last night.  On exam, both the right axillary wound and the suprapubic wound are both healing very well.  There is minimal erythema at either area.  Both incisions actually look great.  I communicated my opinion to the patient.  Both of the drains are in place.  There is no drainage today.  I answered all of the patient's questions and she seems satisfied with the office visit.  She says she is scheduled to see Dr. Roman this Thursday to have the drains removed.  I encouraged her not to go to the emergency room or urgent care center prior to then unless there is some significant change with the wounds, and that would be unlikely.

## 2024-01-29 NOTE — TELEPHONE ENCOUNTER
"PATIENT WAS SEEN IN THE ER AND URGENT CARE ON 01/28/24.     SHE SAID SHE HAS HIDRADENITIS, AND \"YOU DON'T CLOSE IT UP\".  SHE SAID SHE TOLD DR. LUCERO WHEN SHE SAW HIM.    SHE HAS A POCKET FILLING WITH PUSS AND IT IS NOT DRAINING, AND SHE WAS TOLD THE DRAIN NEEDS TO COME OUT.    SHE HAS A LOT OF PAIN, AND THE DRAIN NEEDS TO COME OUT.  "

## 2024-01-29 NOTE — PROGRESS NOTES
Patient requested to be seen in the surgery clinic today to have her surgical wounds evaluated.  She is about 2 weeks status post excision of skin and subcutaneous tissue at her right axilla and suprapubic area for hidradenitis.  [place] drains were placed at both areas.  She said she went to the urgent care center last night because the suprapubic area was tender and red.  She says the provider there told her the suprapubic drain needed to be removed immediately.  No fevers.  Minimal drainage from both of the wounds.  She says the wound looks better today than it did last night.  On exam, both the right axillary wound and the suprapubic wound are both healing very well.  There is minimal erythema at either area.  Both incisions actually look great.  I communicated my opinion to the patient.  Both of the drains are in place.  There is no drainage today.  I answered all of the patient's questions and she seems satisfied with the office visit.  She says she is scheduled to see Dr. Roman this Thursday to have the drains removed.  I encouraged her not to go to the emergency room or urgent care center prior to then unless there is some significant change with the wounds, and that would be unlikely.

## 2024-02-01 ENCOUNTER — OFFICE VISIT (OUTPATIENT)
Dept: SURGERY | Facility: CLINIC | Age: 24
End: 2024-02-01
Payer: COMMERCIAL

## 2024-02-01 VITALS — HEIGHT: 63 IN | BODY MASS INDEX: 36.68 KG/M2 | RESPIRATION RATE: 16 BRPM | WEIGHT: 207 LBS

## 2024-02-01 DIAGNOSIS — L73.2 HIDRADENITIS: Primary | ICD-10-CM

## 2024-02-01 NOTE — PROGRESS NOTES
Chief Complaint: Follow-up (Hidradenitis. Patient states that she cut the drain located in her armpit area.)    Subjective         History of Present Illness  Irene Tabares is a 23 y.o. female presents to [facility] GENERAL SURGERY. The patient is to be seen for follow-up after excision of hidradenitis.    Status post excision of hidradenitis  The patient reports she cut her stitches the best she could on 2024. She states it did not hurt pulling the tube out, but it was numb. The patient denies any oozing.    Objective     Past Medical History:   Diagnosis Date    Hidradenitis suppurativa     Hx of tubal ligation     Substance abuse        Past Surgical History:   Procedure Laterality Date     SECTION      x4    CYST REMOVAL      EXCISION LESION N/A 2024    Procedure: Excision of axillary and groin hidradenitis;  Surgeon: Yuan Roman MD;  Location: [facility];  Service: General;  Laterality: N/A;    TUBAL ABDOMINAL LIGATION           Current Outpatient Medications:     HYDROcodone-acetaminophen (NORCO) 5-325 MG per tablet, Take 1 tablet by mouth Every 6 (Six) Hours As Needed for Mild Pain., Disp: 15 tablet, Rfl: 0    metFORMIN ER (GLUCOPHAGE-XR) 500 MG 24 hr tablet, Take 1 tablet by mouth Daily., Disp: , Rfl:     naloxone (NARCAN) 4 MG/0.1ML nasal spray, Call 911. Don't prime. [place] in 1 nostril for overdose. Repeat in 2-3 minutes in other nostril if no or minimal breathing/responsiveness., Disp: 2 each, Rfl: 0    spironolactone (ALDACTONE) 100 MG tablet, Take 1 tablet by mouth Daily., Disp: , Rfl:     clindamycin (CLEOCIN) 300 MG capsule, Take 1 capsule by mouth Every 12 (Twelve) Hours. (Patient not taking: Reported on 2024), Disp: , Rfl:     sulfamethoxazole-trimethoprim (Bactrim DS) 800-160 MG per tablet, Take 1 tablet by mouth 2 (Two) Times a Day. (Patient not taking: Reported on 2024), Disp: 12 tablet, Rfl: 0    Allergies   Allergen Reactions     Ciprofloxacin Anaphylaxis     Swelling of throat.    Latex Hives    Penicillins Anaphylaxis and Shortness Of Breath        History reviewed. No pertinent family history.    Social History     Socioeconomic History    Marital status: Single   Tobacco Use    Smoking status: Every Day     Packs/day: 1.00     Years: 10.00     Additional pack years: 0.00     Total pack years: 10.00     Types: Cigarettes    Smokeless tobacco: Never    Tobacco comments:     Smoked last yesterday   Vaping Use    Vaping Use: Some days   Substance and Sexual Activity    Alcohol use: Not Currently    Drug use: Not Currently    Sexual activity: Yes        Physical Exam   Post Surgical Incision  Surgical wound: Incisions look pretty well healed. I removed her sutures and drains today. Patient tolerated the procedure at this point in time.    No acute distress. Nonlabored breathing. Abdomen is soft.    Result Review :               Assessment and Plan    There are no diagnoses linked to this encounter.    Assessment:  1. Status post excision of hidradenitis lesions.    Plan:  The patient will follow up with me as needed and call with any questions or concerns. I have recommended no tub bathing or swimming for at least another 2 to 3 weeks until the wounds completely heal. I have also recommended no strenuous activity or heavy lifting for another 2 weeks, but she can go back to go back to work light duty starting tomorrow if she wants and with lifting restriction of 15 pounds for 2 weeks. Discussed with the patient. All questions were answered. She voiced understanding and agreed to proceed with above plan.        Follow Up   No follow-ups on file.  Patient was given instructions and counseling regarding her condition or for health maintenance advice. Please see specific information pulled into the AVS if appropriate.       Transcribed from ambient dictation for Yuan Roman MD by Any Hernandes.  02/01/24   11:58 EST    Patient or patient  representative verbalized consent to the visit recording.  I have personally performed the services described in this document as transcribed by the above individual, and it is both accurate and complete.

## 2024-02-14 ENCOUNTER — APPOINTMENT (OUTPATIENT)
Dept: CT IMAGING | Facility: HOSPITAL | Age: 24
End: 2024-02-14
Payer: COMMERCIAL

## 2024-02-14 ENCOUNTER — HOSPITAL ENCOUNTER (EMERGENCY)
Facility: HOSPITAL | Age: 24
Discharge: ANOTHER HOSPITAL | End: 2024-02-14
Attending: EMERGENCY MEDICINE | Admitting: EMERGENCY MEDICINE
Payer: COMMERCIAL

## 2024-02-14 ENCOUNTER — APPOINTMENT (OUTPATIENT)
Dept: GENERAL RADIOLOGY | Facility: HOSPITAL | Age: 24
End: 2024-02-14
Payer: COMMERCIAL

## 2024-02-14 ENCOUNTER — HOSPITAL ENCOUNTER (EMERGENCY)
Facility: HOSPITAL | Age: 24
Discharge: HOME | End: 2024-02-14
Attending: EMERGENCY MEDICINE | Admitting: EMERGENCY MEDICINE
Payer: COMMERCIAL

## 2024-02-14 VITALS
DIASTOLIC BLOOD PRESSURE: 103 MMHG | BODY MASS INDEX: 35.53 KG/M2 | HEART RATE: 107 BPM | OXYGEN SATURATION: 97 % | TEMPERATURE: 99 F | HEIGHT: 64 IN | RESPIRATION RATE: 16 BRPM | SYSTOLIC BLOOD PRESSURE: 134 MMHG

## 2024-02-14 VITALS
SYSTOLIC BLOOD PRESSURE: 120 MMHG | WEIGHT: 197.31 LBS | RESPIRATION RATE: 20 BRPM | OXYGEN SATURATION: 96 % | DIASTOLIC BLOOD PRESSURE: 75 MMHG | BODY MASS INDEX: 33.87 KG/M2 | HEART RATE: 120 BPM

## 2024-02-14 DIAGNOSIS — S61.411A LACERATION OF RIGHT HAND, FOREIGN BODY PRESENCE UNSPECIFIED, INITIAL ENCOUNTER: Primary | ICD-10-CM

## 2024-02-14 DIAGNOSIS — S66.821A FLEXOR TENDON LACERATION OF RIGHT HAND WITH OPEN WOUND, INITIAL ENCOUNTER: Primary | ICD-10-CM

## 2024-02-14 DIAGNOSIS — F10.229: ICD-10-CM

## 2024-02-14 DIAGNOSIS — S61.401A FLEXOR TENDON LACERATION OF RIGHT HAND WITH OPEN WOUND, INITIAL ENCOUNTER: Primary | ICD-10-CM

## 2024-02-14 LAB
ABO GROUP BLD: NORMAL
ALBUMIN SERPL-MCNC: 4.5 G/DL (ref 3.5–5.2)
ALBUMIN/GLOB SERPL: 1.6 G/DL
ALP SERPL-CCNC: 65 U/L (ref 39–117)
ALT SERPL W P-5'-P-CCNC: 23 U/L (ref 1–33)
ANION GAP SERPL CALCULATED.3IONS-SCNC: 13.8 MMOL/L (ref 5–15)
AST SERPL-CCNC: 19 U/L (ref 1–32)
BASOPHILS # BLD AUTO: 0.03 10*3/MM3 (ref 0–0.2)
BASOPHILS # BLD AUTO: 0.04 10*3/MM3 (ref 0–0.2)
BASOPHILS NFR BLD AUTO: 0.3 % (ref 0–1.5)
BASOPHILS NFR BLD AUTO: 0.3 % (ref 0–1.5)
BILIRUB SERPL-MCNC: 0.2 MG/DL (ref 0–1.2)
BLD GP AB SCN SERPL QL: NEGATIVE
BUN SERPL-MCNC: 7 MG/DL (ref 6–20)
BUN/CREAT SERPL: 8.6 (ref 7–25)
CALCIUM SPEC-SCNC: 9.2 MG/DL (ref 8.6–10.5)
CHLORIDE SERPL-SCNC: 108 MMOL/L (ref 98–107)
CO2 SERPL-SCNC: 23.2 MMOL/L (ref 22–29)
CREAT SERPL-MCNC: 0.81 MG/DL (ref 0.57–1)
DEPRECATED RDW RBC AUTO: 44.4 FL (ref 37–54)
DEPRECATED RDW RBC AUTO: 44.7 FL (ref 37–54)
EGFRCR SERPLBLD CKD-EPI 2021: 103.5 ML/MIN/1.73
EOSINOPHIL # BLD AUTO: 0.02 10*3/MM3 (ref 0–0.4)
EOSINOPHIL # BLD AUTO: 0.05 10*3/MM3 (ref 0–0.4)
EOSINOPHIL NFR BLD AUTO: 0.2 % (ref 0.3–6.2)
EOSINOPHIL NFR BLD AUTO: 0.3 % (ref 0.3–6.2)
ERYTHROCYTE [DISTWIDTH] IN BLOOD BY AUTOMATED COUNT: 13.2 % (ref 12.3–15.4)
ERYTHROCYTE [DISTWIDTH] IN BLOOD BY AUTOMATED COUNT: 13.2 % (ref 12.3–15.4)
ETHANOL BLD-MCNC: 130 MG/DL (ref 0–10)
ETHANOL BLD-MCNC: 201 MG/DL (ref 0–10)
ETHANOL UR QL: 0.13 %
ETHANOL UR QL: 0.2 %
GLOBULIN UR ELPH-MCNC: 2.9 GM/DL
GLUCOSE SERPL-MCNC: 114 MG/DL (ref 65–99)
HCT VFR BLD AUTO: 34.1 % (ref 34–46.6)
HCT VFR BLD AUTO: 40.3 % (ref 34–46.6)
HGB BLD-MCNC: 11.2 G/DL (ref 12–15.9)
HGB BLD-MCNC: 13.3 G/DL (ref 12–15.9)
IMM GRANULOCYTES # BLD AUTO: 0.05 10*3/MM3 (ref 0–0.05)
IMM GRANULOCYTES # BLD AUTO: 0.07 10*3/MM3 (ref 0–0.05)
IMM GRANULOCYTES NFR BLD AUTO: 0.5 % (ref 0–0.5)
IMM GRANULOCYTES NFR BLD AUTO: 0.5 % (ref 0–0.5)
INR PPP: 1.06 (ref 0.86–1.15)
LYMPHOCYTES # BLD AUTO: 1.68 10*3/MM3 (ref 0.7–3.1)
LYMPHOCYTES # BLD AUTO: 2.8 10*3/MM3 (ref 0.7–3.1)
LYMPHOCYTES NFR BLD AUTO: 18.4 % (ref 19.6–45.3)
LYMPHOCYTES NFR BLD AUTO: 19 % (ref 19.6–45.3)
MCH RBC QN AUTO: 30.5 PG (ref 26.6–33)
MCH RBC QN AUTO: 30.6 PG (ref 26.6–33)
MCHC RBC AUTO-ENTMCNC: 32.8 G/DL (ref 31.5–35.7)
MCHC RBC AUTO-ENTMCNC: 33 G/DL (ref 31.5–35.7)
MCV RBC AUTO: 92.9 FL (ref 79–97)
MCV RBC AUTO: 92.9 FL (ref 79–97)
MONOCYTES # BLD AUTO: 0.38 10*3/MM3 (ref 0.1–0.9)
MONOCYTES # BLD AUTO: 0.51 10*3/MM3 (ref 0.1–0.9)
MONOCYTES NFR BLD AUTO: 3.5 % (ref 5–12)
MONOCYTES NFR BLD AUTO: 4.2 % (ref 5–12)
NEUTROPHILS NFR BLD AUTO: 11.29 10*3/MM3 (ref 1.7–7)
NEUTROPHILS NFR BLD AUTO: 6.95 10*3/MM3 (ref 1.7–7)
NEUTROPHILS NFR BLD AUTO: 76.4 % (ref 42.7–76)
NEUTROPHILS NFR BLD AUTO: 76.4 % (ref 42.7–76)
NRBC BLD AUTO-RTO: 0 /100 WBC (ref 0–0.2)
NRBC BLD AUTO-RTO: 0 /100 WBC (ref 0–0.2)
PLATELET # BLD AUTO: 183 10*3/MM3 (ref 140–450)
PLATELET # BLD AUTO: 298 10*3/MM3 (ref 140–450)
PMV BLD AUTO: 10.9 FL (ref 6–12)
PMV BLD AUTO: 11.1 FL (ref 6–12)
POTASSIUM SERPL-SCNC: 3.3 MMOL/L (ref 3.5–5.2)
PROT SERPL-MCNC: 7.4 G/DL (ref 6–8.5)
PROTHROMBIN TIME: 14 SECONDS (ref 11.8–14.9)
RBC # BLD AUTO: 3.67 10*6/MM3 (ref 3.77–5.28)
RBC # BLD AUTO: 4.34 10*6/MM3 (ref 3.77–5.28)
RH BLD: POSITIVE
SODIUM SERPL-SCNC: 145 MMOL/L (ref 136–145)
T&S EXPIRATION DATE: NORMAL
WBC NRBC COR # BLD AUTO: 14.76 10*3/MM3 (ref 3.4–10.8)
WBC NRBC COR # BLD AUTO: 9.11 10*3/MM3 (ref 3.4–10.8)

## 2024-02-14 PROCEDURE — 25010000002 HALOPERIDOL LACTATE PER 5 MG: Performed by: EMERGENCY MEDICINE

## 2024-02-14 PROCEDURE — 85025 COMPLETE CBC W/AUTO DIFF WBC: CPT | Performed by: EMERGENCY MEDICINE

## 2024-02-14 PROCEDURE — 86901 BLOOD TYPING SEROLOGIC RH(D): CPT | Performed by: EMERGENCY MEDICINE

## 2024-02-14 PROCEDURE — 73130 X-RAY EXAM OF HAND: CPT

## 2024-02-14 PROCEDURE — 99285 EMERGENCY DEPT VISIT HI MDM: CPT

## 2024-02-14 PROCEDURE — 25810000003 SODIUM CHLORIDE 0.9 % SOLUTION: Performed by: EMERGENCY MEDICINE

## 2024-02-14 PROCEDURE — 82077 ASSAY SPEC XCP UR&BREATH IA: CPT | Performed by: EMERGENCY MEDICINE

## 2024-02-14 PROCEDURE — 36415 COLL VENOUS BLD VENIPUNCTURE: CPT

## 2024-02-14 PROCEDURE — 99291 CRITICAL CARE FIRST HOUR: CPT

## 2024-02-14 PROCEDURE — 99282 EMERGENCY DEPT VISIT SF MDM: CPT

## 2024-02-14 PROCEDURE — 25010000002 MIDAZOLAM PER 1MG: Performed by: EMERGENCY MEDICINE

## 2024-02-14 PROCEDURE — 96375 TX/PRO/DX INJ NEW DRUG ADDON: CPT

## 2024-02-14 PROCEDURE — 80053 COMPREHEN METABOLIC PANEL: CPT | Performed by: EMERGENCY MEDICINE

## 2024-02-14 PROCEDURE — 86850 RBC ANTIBODY SCREEN: CPT | Performed by: EMERGENCY MEDICINE

## 2024-02-14 PROCEDURE — 85610 PROTHROMBIN TIME: CPT | Performed by: EMERGENCY MEDICINE

## 2024-02-14 PROCEDURE — 96374 THER/PROPH/DIAG INJ IV PUSH: CPT

## 2024-02-14 PROCEDURE — 86900 BLOOD TYPING SEROLOGIC ABO: CPT | Performed by: EMERGENCY MEDICINE

## 2024-02-14 RX ORDER — HALOPERIDOL 5 MG/ML
5 INJECTION INTRAMUSCULAR ONCE
Status: COMPLETED | OUTPATIENT
Start: 2024-02-14 | End: 2024-02-14

## 2024-02-14 RX ORDER — MIDAZOLAM HYDROCHLORIDE 2 MG/2ML
2 INJECTION, SOLUTION INTRAMUSCULAR; INTRAVENOUS ONCE
Status: COMPLETED | OUTPATIENT
Start: 2024-02-14 | End: 2024-02-14

## 2024-02-14 RX ORDER — SODIUM CHLORIDE 0.9 % (FLUSH) 0.9 %
10 SYRINGE (ML) INJECTION AS NEEDED
Status: DISCONTINUED | OUTPATIENT
Start: 2024-02-14 | End: 2024-02-14 | Stop reason: HOSPADM

## 2024-02-14 RX ORDER — LIDOCAINE HYDROCHLORIDE AND EPINEPHRINE 10; 10 MG/ML; UG/ML
10 INJECTION, SOLUTION INFILTRATION; PERINEURAL ONCE
Status: DISCONTINUED | OUTPATIENT
Start: 2024-02-14 | End: 2024-02-14 | Stop reason: HOSPADM

## 2024-02-14 RX ADMIN — SODIUM CHLORIDE 1000 ML: 9 INJECTION, SOLUTION INTRAVENOUS at 07:52

## 2024-02-14 RX ADMIN — SODIUM CHLORIDE 1000 ML: 9 INJECTION, SOLUTION INTRAVENOUS at 06:39

## 2024-02-14 RX ADMIN — MIDAZOLAM HYDROCHLORIDE 2 MG: 1 INJECTION, SOLUTION INTRAMUSCULAR; INTRAVENOUS at 06:45

## 2024-02-14 RX ADMIN — MIDAZOLAM HYDROCHLORIDE 2 MG: 1 INJECTION, SOLUTION INTRAMUSCULAR; INTRAVENOUS at 06:34

## 2024-02-14 RX ADMIN — HALOPERIDOL LACTATE 5 MG: 5 INJECTION, SOLUTION INTRAMUSCULAR at 06:34

## 2024-02-14 NOTE — ED NOTES
"Patient continuing to cuss at staff and refusing treatment. Patient has continued all nurses in the room to \"get the fuck out\" Md currently at bedside.  "

## 2024-02-14 NOTE — ED NOTES
"Tech went in to collect repeat bloodwork, patient asked if she was going home, she was informed she was being transferred to [facility]. Patient yelled at staff \"bitch I'm not going anywhere until my baby daddy is here.\"  Patient stated her phone was about to die. Staffed member asked for the phone number to call using hospital phone. Patient gave staff member the number. Patient asked for another blanket and geovanny brought patient another blanket.   After leaving room, patient began to yell and cuss in her room, patient privacy provided and RN, LIZZETH notified and went to patient room.   "

## 2024-02-14 NOTE — ED PROVIDER NOTES
Time: 6:36 AM EST  Date of encounter:  2024  Independent Historian/Clinical History and Information was obtained by:   Patient, Nursing Staff, and Police    History is limited by:  Significantly intoxicated, yelling and screaming at staff    Chief Complaint: Right hand laceration with significant blood loss      History of Present Illness:  Patient is a 23 y.o. year old female who presents to the emergency department for evaluation of a deep right hand laceration to the palm of the right hand that is unclear how it occurred this morning.    This is her second visit to the ED in the past couple of hours for this complaint as she came in significantly intoxicated after drinking 1/5 of whiskey and was screaming and yelling at staff and refusing any care on her previous ED visit today.    She is now back in police custody for treatment.    The please have placed a tourniquet on the right upper extremity due to the significant blood loss as her shirt is completely soaked with blood.    She is unable to feel or move the right middle finger in the setting of the palmar laceration of the right hand.    She was not trying to harm herself and this was accidental injury.    HPI    Patient Care Team  Primary Care Provider: Russ Cobb APRN    Past Medical History:     Allergies   Allergen Reactions    Ciprofloxacin Anaphylaxis     Swelling of throat.    Latex Hives    Penicillins Anaphylaxis and Shortness Of Breath     Past Medical History:   Diagnosis Date    Hidradenitis suppurativa     Hx of tubal ligation     Substance abuse      Past Surgical History:   Procedure Laterality Date     SECTION      x4    CYST REMOVAL      EXCISION LESION N/A 2024    Procedure: Excision of axillary and groin hidradenitis;  Surgeon: Yuan Roman MD;  Location: [facility] OR;  Service: General;  Laterality: N/A;    TUBAL ABDOMINAL LIGATION       No family history on file.    Home Medications:  Prior to Admission  medications    Medication Sig Start Date End Date Taking? Authorizing Provider   clindamycin (CLEOCIN) 300 MG capsule Take 1 capsule by mouth Every 12 (Twelve) Hours.  Patient not taking: Reported on 1/29/2024 7/14/23   Reyna Varghese MD   HYDROcodone-acetaminophen (NORCO) 5-325 MG per tablet Take 1 tablet by mouth Every 6 (Six) Hours As Needed for Mild Pain. 1/28/24   Lui Rivas MD   metFORMIN ER (GLUCOPHAGE-XR) 500 MG 24 hr tablet Take 1 tablet by mouth Daily.    ProviderReyna MD   naloxone (NARCAN) 4 MG/0.1ML nasal spray Call 911. Don't prime. [place] in 1 nostril for overdose. Repeat in 2-3 minutes in other nostril if no or minimal breathing/responsiveness. 1/17/24   Yuan Roman MD   spironolactone (ALDACTONE) 100 MG tablet Take 1 tablet by mouth Daily. 11/21/23   ProviderReyna MD   sulfamethoxazole-trimethoprim (Bactrim DS) 800-160 MG per tablet Take 1 tablet by mouth 2 (Two) Times a Day.  Patient not taking: Reported on 2/1/2024 1/22/24   Yuan Roman MD        Social History:   Social History     Tobacco Use    Smoking status: Every Day     Packs/day: 1.00     Years: 10.00     Additional pack years: 0.00     Total pack years: 10.00     Types: Cigarettes    Smokeless tobacco: Never    Tobacco comments:     Smoked last yesterday   Vaping Use    Vaping Use: Some days   Substance Use Topics    Alcohol use: Not Currently    Drug use: Not Currently         Review of Systems:  Review of Systems   I performed a 10 point review of systems which was all negative, except for the positives found in the HPI above.  Physical Exam:  /75   Pulse 120   Resp 20   Wt 89.5 kg (197 lb 5 oz)   LMP 01/16/2024 (Approximate)   SpO2 96%   BMI 33.87 kg/m²     Physical Exam   General: Awake alert and in severe psychological distress, intoxicated with alcohol, yelling and screaming at staff unable to hold still, crying, being restrained by police.    HEENT: Head normocephalic  atraumatic, eyes PERRLA EOMI, nose normal, oropharynx normal.    Neck: Supple full range of motion, no meningismus, no lymphadenopathy    Heart: Regular rate and rhythm, no murmurs or rubs, 2+ radial pulses bilaterally    Lungs: Clear to auscultation bilaterally without wheezes or crackles, no respiratory distress    Abdomen: Soft, nontender, nondistended, no rebound or guarding    Skin: Warm, dry, no rash    Musculoskeletal: She has a 3.5 cm full-thickness laceration to the palm of the right hand with significant active bleeding that is not actually pulsatile, and unable to feel or move her right middle finger, so I presume injury to flexor tendon and apparent vascular injury, arrives with tourniquet to the right upper extremity for the past 30 minutes (which I cautiously let down on arrival), no lower extremity edema    Neurologic: Oriented x3, no motor deficits no sensory deficits    Psychiatric: Mood appears stable, no psychosis          Procedures:  Procedures      Medical Decision Making:      Comorbidities that affect care:    Alcohol abuse, intoxication    External Notes reviewed:    Previous Labs: I reviewed her old lab work and it looks like her hemoglobin is 13.6 as of 2 weeks ago and normal platelets of 209      The following orders were placed and all results were independently analyzed by me:  Orders Placed This Encounter   Procedures    Comprehensive Metabolic Panel    Protime-INR    CBC Auto Differential    Ethanol    Ethanol    CBC Auto Differential    Type & Screen    CBC & Differential    CBC & Differential       Medications Given in the Emergency Department:  Medications   sodium chloride 0.9 % bolus 1,000 mL (0 mL Intravenous Stopped 2/14/24 0720)   haloperidol lactate (HALDOL) injection 5 mg (5 mg Intravenous Given 2/14/24 0634)   Midazolam HCl (PF) (VERSED) injection 2 mg (2 mg Intravenous Given 2/14/24 0634)   Midazolam HCl (PF) (VERSED) injection 2 mg (2 mg Intravenous Given 2/14/24 0645)    sodium chloride 0.9 % bolus 1,000 mL (0 mL Intravenous Stopped 2/14/24 0939)        ED Course:         Labs:    Lab Results (last 24 hours)       Procedure Component Value Units Date/Time    CBC & Differential [[id number]]  (Abnormal) Collected: 02/14/24 0628    Specimen: Blood Updated: 02/14/24 0647    Narrative:      The following orders were created for panel order CBC & Differential.  Procedure                               Abnormality         Status                     ---------                               -----------         ------                     CBC Auto Differential[[id number]]        Abnormal            Final result                 Please view results for these tests on the individual orders.    Comprehensive Metabolic Panel [[id number]]  (Abnormal) Collected: 02/14/24 0628    Specimen: Blood Updated: 02/14/24 0709     Glucose 114 mg/dL      BUN 7 mg/dL      Creatinine 0.81 mg/dL      Sodium 145 mmol/L      Potassium 3.3 mmol/L      Chloride 108 mmol/L      CO2 23.2 mmol/L      Calcium 9.2 mg/dL      Total Protein 7.4 g/dL      Albumin 4.5 g/dL      ALT (SGPT) 23 U/L      AST (SGOT) 19 U/L      Alkaline Phosphatase 65 U/L      Total Bilirubin 0.2 mg/dL      Globulin 2.9 gm/dL      A/G Ratio 1.6 g/dL      BUN/Creatinine Ratio 8.6     Anion Gap 13.8 mmol/L      eGFR 103.5 mL/min/1.73     Narrative:      GFR Normal >60  Chronic Kidney Disease <60  Kidney Failure <15      Protime-INR [[id number]]  (Normal) Collected: 02/14/24 0628    Specimen: Blood Updated: 02/14/24 0707     Protime 14.0 Seconds      INR 1.06    Narrative:      Suggested Therapeutic Ranges For Oral Anticoagulant Therapy:  Level of Therapy                      INR Target Range  Standard Dose                            2.0-3.0  High Dose                                2.5-3.5  Patients not receiving anticoagulant  Therapy Normal Range                     0.86-1.15    CBC Auto Differential [[id number]]  (Abnormal) Collected: 02/14/24 0628     Specimen: Blood Updated: 02/14/24 0647     WBC 14.76 10*3/mm3      RBC 4.34 10*6/mm3      Hemoglobin 13.3 g/dL      Hematocrit 40.3 %      MCV 92.9 fL      MCH 30.6 pg      MCHC 33.0 g/dL      RDW 13.2 %      RDW-SD 44.7 fl      MPV 11.1 fL      Platelets 298 10*3/mm3      Neutrophil % 76.4 %      Lymphocyte % 19.0 %      Monocyte % 3.5 %      Eosinophil % 0.3 %      Basophil % 0.3 %      Immature Grans % 0.5 %      Neutrophils, Absolute 11.29 10*3/mm3      Lymphocytes, Absolute 2.80 10*3/mm3      Monocytes, Absolute 0.51 10*3/mm3      Eosinophils, Absolute 0.05 10*3/mm3      Basophils, Absolute 0.04 10*3/mm3      Immature Grans, Absolute 0.07 10*3/mm3      nRBC 0.0 /100 WBC     Ethanol [[id number]]  (Abnormal) Collected: 02/14/24 0628    Specimen: Blood Updated: 02/14/24 0709     Ethanol 201 mg/dL      Ethanol % 0.201 %     Narrative:      Ethanol (Plasma)  <10 Essentially Negative    Toxic Concentrations           mg/dL    Flushing, slowing of reflexes    Impaired visual activity         Depression of CNS              >100  Possible Coma                  >300       CBC & Differential [[id number]]  (Abnormal) Collected: 02/14/24 0915    Specimen: Blood Updated: 02/14/24 0937    Narrative:      The following orders were created for panel order CBC & Differential.  Procedure                               Abnormality         Status                     ---------                               -----------         ------                     CBC Auto Differential[[id number]]        Abnormal            Final result                 Please view results for these tests on the individual orders.    Ethanol [[id number]]  (Abnormal) Collected: 02/14/24 0915    Specimen: Blood Updated: 02/14/24 0937     Ethanol 130 mg/dL      Ethanol % 0.130 %     Narrative:      Ethanol (Plasma)  <10 Essentially Negative    Toxic Concentrations           mg/dL    Flushing, slowing of reflexes    Impaired visual activity          Depression of CNS              >100  Possible Coma                  >300       CBC Auto Differential [[id number]]  (Abnormal) Collected: 02/14/24 0915    Specimen: Blood Updated: 02/14/24 0937     WBC 9.11 10*3/mm3      RBC 3.67 10*6/mm3      Hemoglobin 11.2 g/dL      Hematocrit 34.1 %      MCV 92.9 fL      MCH 30.5 pg      MCHC 32.8 g/dL      RDW 13.2 %      RDW-SD 44.4 fl      MPV 10.9 fL      Platelets 183 10*3/mm3      Neutrophil % 76.4 %      Lymphocyte % 18.4 %      Monocyte % 4.2 %      Eosinophil % 0.2 %      Basophil % 0.3 %      Immature Grans % 0.5 %      Neutrophils, Absolute 6.95 10*3/mm3      Lymphocytes, Absolute 1.68 10*3/mm3      Monocytes, Absolute 0.38 10*3/mm3      Eosinophils, Absolute 0.02 10*3/mm3      Basophils, Absolute 0.03 10*3/mm3      Immature Grans, Absolute 0.05 10*3/mm3      nRBC 0.0 /100 WBC              Imaging:    XR Hand 3+ View Right    Result Date: 2/14/2024  PROCEDURE: XR HAND 3+ VW RIGHT  COMPARISON: [facility], CR, XR HAND 2 VW RIGHT, 7/02/2022, 10:57.  INDICATIONS: LACERATION TO PALM OF RIGHT HAND  FINDINGS:  Technologist note states that the patient could not be adequately positioned.  Her second, fourth, and fifth fingers are flexed on all views, limiting evaluation for acute injury.  Allowing for this limitation, no definite acute fracture or dislocation is identified.  There is palmar soft tissue air, but no radiopaque foreign bodies are identified.        Limited positioning without definite acute osseous abnormality.      SHARONDA CAVAZOS MD       Electronically Signed and Approved By: SHARONDA CAVAZOS MD on 2/14/2024 at 5:23                Differential Diagnosis and Discussion:    Laceration: Laceration was evaluated for arterial injury, ligamentous damage, and other neurovascular injury.  Psychiatric: Differential diagnosis includes but is not limited to depression, psychosis, bipolar disorder, anxiety, manic episode, schizophrenia, and substance  abuse.    All labs were reviewed and interpreted by me.    MDM     Amount and/or Complexity of Data Reviewed  Clinical lab tests: reviewed             This patient is a 23-year-old female who presents with a deep laceration to the palm of the right hand causing injury to vascular structures and flexor tendon, unable to feel or move her right middle finger and pouring blood all over.    This ED visit is significantly complicated due to her acute alcohol intoxication and screaming and yelling at staff and refusing care initially.    She is being restrained by police and arrives with a tourniquet as this is her second ED visit in the past hour or 2, as she refused care on the first visit.    She is not letting me take care of her wound which is actively bleeding when I let the tourniquet down, although not pulsatile bleeding.    It looks like she will need to be transferred to see a hand surgeon such as at [facility] in [place], as she clearly has some deep vascular injury to the hand and flexor tendon injury as well.    We are attempting to sedate her with Haldol and Versed in the ED..      After a few sedative medications in the ED patient was more sedated and cooperative.    I conversed with the hand surgeon in [place] who agrees to see her after she is sobered up some and we are trying to get family member present to help her consent for surgery.      Bleeding is reasonably well-controlled at time of transfer with pressure dressing and elevation of the extremity and no tourniquet in place at this time.  Total tourniquet time was only 35 minutes or so.      Critical Care Note: Total Critical Care time of 35 minutes. Total critical care time documented does not include time spent on separately billed procedures for services of nurses or physician assistants. I personally saw and examined the patient. I have reviewed all diagnostic interpretations and treatment plans as written. I was present for the key  portions of any procedures performed and the inclusive time noted in any critical care statement. Critical care time includes patient management by me, time spent at the patients bedside,  time to review lab and imaging results, discussing patient care, documentation in the medical record, and time spent with family or caregiver.        Patient Care Considerations:          Consultants/Shared Management Plan:    Transfer Provider: I have discussed the case with on-call hand surgeon at [facility] who agrees to accept the patient as a transfer.    Social Determinants of Health:    Patient is intoxicated and not able to make her own medical decisions at this time, brought in in police custody for medical treatment      Disposition and Care Coordination:    Transferred: Through independent evaluation of the patient's history, physical, and imperical data, the patient meets criteria to be transferred to another hospital for evaluation/admission.        Final diagnoses:   Flexor tendon laceration of right hand with open wound, initial encounter   Acute alcohol intoxication with alcoholism, with unspecified complication        ED Disposition       ED Disposition   Transfer to Another Facility     Condition   --    Comment   [facility] hand surgery               This medical record created using voice recognition software.             Sylvester Escudero MD  02/14/24 [id number]
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
## 2024-02-14 NOTE — ED PROVIDER NOTES
Time: 4:26 AM EST  Date of encounter:  2024  Independent Historian/Clinical History and Information was obtained by:   Patient    History is limited by: N/A    Chief Complaint: hand laceration      History of Present Illness:  Patient is a 23 y.o. year old female who presents to the emergency department for evaluation of Hand laceration.  Patient refusing to give any information.  She is yelling and screaming at staff.    HPI    Patient Care Team  Primary Care Provider: Russ Cobb APRN    Past Medical History:     Allergies   Allergen Reactions    Ciprofloxacin Anaphylaxis     Swelling of throat.    Latex Hives    Penicillins Anaphylaxis and Shortness Of Breath     Past Medical History:   Diagnosis Date    Hidradenitis suppurativa     Hx of tubal ligation     Substance abuse      Past Surgical History:   Procedure Laterality Date     SECTION      x4    CYST REMOVAL      EXCISION LESION N/A 2024    Procedure: Excision of axillary and groin hidradenitis;  Surgeon: Yuan Roman MD;  Location: [facility];  Service: General;  Laterality: N/A;    TUBAL ABDOMINAL LIGATION       No family history on file.    Home Medications:  Prior to Admission medications    Medication Sig Start Date End Date Taking? Authorizing Provider   clindamycin (CLEOCIN) 300 MG capsule Take 1 capsule by mouth Every 12 (Twelve) Hours.  Patient not taking: Reported on 2024   ProviderReyna MD   HYDROcodone-acetaminophen (NORCO) 5-325 MG per tablet Take 1 tablet by mouth Every 6 (Six) Hours As Needed for Mild Pain. 24   Lui Rivas MD   metFORMIN ER (GLUCOPHAGE-XR) 500 MG 24 hr tablet Take 1 tablet by mouth Daily.    ProviderReyna MD   naloxone (NARCAN) 4 MG/0.1ML nasal spray Call 911. Don't prime. [place] in 1 nostril for overdose. Repeat in 2-3 minutes in other nostril if no or minimal breathing/responsiveness. 24   Yuan Roman MD   spironolactone (ALDACTONE) 100  "MG tablet Take 1 tablet by mouth Daily. 11/21/23   Provider, MD Reyna   sulfamethoxazole-trimethoprim (Bactrim DS) 800-160 MG per tablet Take 1 tablet by mouth 2 (Two) Times a Day.  Patient not taking: Reported on 2/1/2024 1/22/24   Yuan Roman MD        Social History:   Social History     Tobacco Use    Smoking status: Every Day     Packs/day: 1.00     Years: 10.00     Additional pack years: 0.00     Total pack years: 10.00     Types: Cigarettes    Smokeless tobacco: Never    Tobacco comments:     Smoked last yesterday   Vaping Use    Vaping Use: Some days   Substance Use Topics    Alcohol use: Not Currently    Drug use: Not Currently         Review of Systems:  Review of Systems   Unable to perform ROS: Other (patient refusing)        Physical Exam:  BP (!) 134/103 (Patient Position: Lying)   Pulse 107   Temp 99 °F (37.2 °C) (Oral)   Resp 16   Ht 162.6 cm (64\")   LMP 01/16/2024 (Approximate)   SpO2 97%   BMI 35.53 kg/m²     Physical Exam  HENT:      Head: Normocephalic.      Right Ear: External ear normal.      Left Ear: External ear normal.      Nose: Nose normal.   Eyes:      Comments: Left periorbital ecchymosis    Cardiovascular:      Rate and Rhythm: Normal rate.      Pulses: Normal pulses.   Pulmonary:      Effort: Pulmonary effort is normal.   Abdominal:      General: Abdomen is flat.   Musculoskeletal:      Cervical back: Normal range of motion.      Comments: Laceration to palm of right hand    Skin:     General: Skin is warm.   Neurological:      General: No focal deficit present.      Mental Status: She is alert and oriented to person, place, and time.                  Procedures:  Procedures      Medical Decision Making:      Comorbidities that affect care:    Smoking, Substance Abuse    External Notes reviewed:    Previous Clinic Note: Patient was seen by surgery on 2/1/2024 for hidradenitis      The following orders were placed and all results were independently analyzed by " "me:  Orders Placed This Encounter   Procedures    XR Hand 3+ View Right    CT Head Without Contrast    [place] Draw    Comprehensive Metabolic Panel    Acetaminophen Level    Ethanol    Salicylate Level    Urine Drug Screen - Urine, Clean Catch    hCG, Quantitative, Pregnancy    CBC Auto Differential    Vital Signs    Insert Peripheral IV    CBC & Differential    Green Top (Gel)    Lavender Top    Gold Top - SST    Light Blue Top       Medications Given in the Emergency Department:  Medications   sodium chloride 0.9 % flush 10 mL (has no administration in time range)   Tetanus-Diphth-Acell Pertussis (BOOSTRIX) injection 0.5 mL (has no administration in time range)   lidocaine 1% - EPINEPHrine 1:791785 (XYLOCAINE W/EPI) 1 %-1:540059 injection 10 mL (has no administration in time range)        ED Course:         Labs:    Lab Results (last 24 hours)       ** No results found for the last 24 hours. **             Imaging:    No Radiology Exams Resulted Within Past 24 Hours      Differential Diagnosis and Discussion:    Laceration: Laceration was evaluated for arterial injury, ligamentous damage, and other neurovascular injury.        MDM  Number of Diagnoses or Management Options  Laceration of right hand, foreign body presence unspecified, initial encounter  Diagnosis management comments: Patient has a laceration to the palm of her right hand but is refusing for us to examine the laceration.  Patient is yelling and screaming at staff being extremely verbally abusive.  She is refusing to have staff come in the room.  I tried to explain to the patient that this behavior is not going to be tolerated.  I tried explained that we want to try and help but we can help if she will not let us.  Patient continued to scream and verbally abused staff.  Patient told me that I am not allowed to go into her room.  She stated \"f$ck off and get the f$ck out\".   At this time since patient is refusing care and continued to verbally abuse " staff she will be discharged.  She is alert and oriented. She is ambulating without assistance. I provided a number for hand surgeon to follow up with.     Risk of Complications, Morbidity, and/or Mortality  Presenting problems: moderate  Management options: moderate                 Patient Care Considerations:    CT HEAD: I considered ordering a noncontrast CT of the head, however patient refused      Consultants/Shared Management Plan:    None    Social Determinants of Health:    Patient is independent, reliable, and has access to care.       Disposition and Care Coordination:    Discharged: The patient is suitable and stable for discharge with no need for consideration of admission.    I have explained the patient´s condition, diagnoses and treatment plan based on the information available to me at this time. I have answered questions and addressed any concerns. The patient has a good  understanding of the patient´s diagnosis, condition, and treatment plan as can be expected at this point. The vital signs have been stable. The patient´s condition is stable and appropriate for discharge from the emergency department.      The patient will pursue further outpatient evaluation with the primary care physician or other designated or consulting physician as outlined in the discharge instructions. They are agreeable to this plan of care and follow-up instructions have been explained in detail. The patient has received these instructions in written format and has expressed an understanding of the discharge instructions. The patient is aware that any significant change in condition or worsening of symptoms should prompt an immediate return to this or the closest emergency department or call to 911.  I have explained discharge medications and the need for follow up with the patient/caretakers. This was also printed in the discharge instructions. Patient was discharged with the following medications and follow up:       Medication List      No changes were made to your prescriptions during this visit.      Russ Cobb, APRN  [address] [address]  [phone]    In 2 days      [facility] [address] [place] [address] [phone]  Schedule an appointment as soon as possible for a visit          Final diagnoses:   Laceration of right hand, foreign body presence unspecified, initial encounter        ED Disposition       ED Disposition   Discharge    Condition   --    Comment   --               This medical record created using voice recognition software.             Humaira Wilkins MD  02/14/24 6561

## 2024-05-13 ENCOUNTER — HOSPITAL ENCOUNTER (EMERGENCY)
Facility: HOSPITAL | Age: 24
Discharge: HOME | End: 2024-05-14
Attending: EMERGENCY MEDICINE
Payer: COMMERCIAL

## 2024-05-13 VITALS
DIASTOLIC BLOOD PRESSURE: 93 MMHG | OXYGEN SATURATION: 98 % | BODY MASS INDEX: 34.41 KG/M2 | TEMPERATURE: 98.3 F | HEIGHT: 63 IN | HEART RATE: 97 BPM | SYSTOLIC BLOOD PRESSURE: 133 MMHG | WEIGHT: 194.22 LBS | RESPIRATION RATE: 18 BRPM

## 2024-05-13 DIAGNOSIS — N76.0 BACTERIAL VAGINOSIS: ICD-10-CM

## 2024-05-13 DIAGNOSIS — R30.0 DYSURIA: ICD-10-CM

## 2024-05-13 DIAGNOSIS — T07.XXXA MULTIPLE BRUISES: ICD-10-CM
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
## 2024-05-13 DIAGNOSIS — B96.89 BACTERIAL VAGINOSIS: ICD-10-CM

## 2024-05-13 LAB
AMPHET+METHAMPHET UR QL: NEGATIVE
B-HCG UR QL: NEGATIVE
BACTERIA UR QL AUTO: NORMAL /HPF
BARBITURATES UR QL SCN: NEGATIVE
BENZODIAZ UR QL SCN: POSITIVE
BILIRUB UR QL STRIP: NEGATIVE
CANNABINOIDS SERPL QL: POSITIVE
CLARITY UR: CLEAR
COCAINE UR QL: NEGATIVE
COLOR UR: YELLOW
FENTANYL UR-MCNC: NEGATIVE NG/ML
GLUCOSE UR STRIP-MCNC: NEGATIVE MG/DL
HGB UR QL STRIP.AUTO: NEGATIVE
HYALINE CASTS UR QL AUTO: NORMAL /LPF
KETONES UR QL STRIP: ABNORMAL
LEUKOCYTE ESTERASE UR QL STRIP.AUTO: ABNORMAL
METHADONE UR QL SCN: NEGATIVE
NITRITE UR QL STRIP: NEGATIVE
OPIATES UR QL: NEGATIVE
OXYCODONE UR QL SCN: NEGATIVE
PH UR STRIP.AUTO: 6.5 [PH] (ref 5–8)
PROT UR QL STRIP: NEGATIVE
RBC # UR STRIP: NORMAL /HPF
REF LAB TEST METHOD: NORMAL
SP GR UR STRIP: 1.02 (ref 1–1.03)
SQUAMOUS #/AREA URNS HPF: NORMAL /HPF
T PALLIDUM IGG SER QL: NORMAL
UROBILINOGEN UR QL STRIP: ABNORMAL
WBC # UR STRIP: NORMAL /HPF

## 2024-05-13 PROCEDURE — 80307 DRUG TEST PRSMV CHEM ANLYZR: CPT

## 2024-05-13 PROCEDURE — 81025 URINE PREGNANCY TEST: CPT

## 2024-05-13 PROCEDURE — 86780 TREPONEMA PALLIDUM: CPT | Performed by: EMERGENCY MEDICINE

## 2024-05-13 PROCEDURE — 81001 URINALYSIS AUTO W/SCOPE: CPT | Performed by: EMERGENCY MEDICINE

## 2024-05-13 PROCEDURE — 99283 EMERGENCY DEPT VISIT LOW MDM: CPT

## 2024-05-13 PROCEDURE — 36415 COLL VENOUS BLD VENIPUNCTURE: CPT

## 2024-05-13 RX ORDER — ONDANSETRON 4 MG/1
4 TABLET, ORALLY DISINTEGRATING ORAL ONCE
Status: DISCONTINUED | OUTPATIENT
Start: 2024-05-13 | End: 2024-05-14

## 2024-05-13 RX ORDER — AZITHROMYCIN 250 MG/1
2000 TABLET, FILM COATED ORAL ONCE
Status: DISCONTINUED | OUTPATIENT
Start: 2024-05-13 | End: 2024-05-14

## 2024-05-13 RX ORDER — GENTAMICIN 40 MG/ML
240 INJECTION, SOLUTION INTRAMUSCULAR; INTRAVENOUS ONCE
Status: DISCONTINUED | OUTPATIENT
Start: 2024-05-13 | End: 2024-05-14

## 2024-05-13 RX ORDER — AZITHROMYCIN 250 MG/1
1000 TABLET, FILM COATED ORAL ONCE
Status: DISCONTINUED | OUTPATIENT
Start: 2024-05-13 | End: 2024-05-13

## 2024-05-13 RX ORDER — METRONIDAZOLE 500 MG/1
2000 TABLET ORAL ONCE
Status: DISCONTINUED | OUTPATIENT
Start: 2024-05-13 | End: 2024-05-14

## 2024-05-13 NOTE — Clinical Note
[facility] EMERGENCY ROOM  [address]  [place] [address]  Phone: [phone]  Fax: [phone]    Irene Tabares was seen and treated in our emergency department on 5/13/2024.  She may return to work on 05/21/2024.         Thank you for choosing [facility].    Amy Zarate APRN
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
## 2024-05-13 NOTE — Clinical Note
[facility] EMERGENCY ROOM  [address]  [place] [address]  Phone: [phone]  Fax: [phone]    Irene Tabares was seen and treated in our emergency department on 5/13/2024.  She may return to work on 05/21/2024.         Thank you for choosing [facility].    Amy Zarate APRN

## 2024-05-14 ENCOUNTER — TELEPHONE (OUTPATIENT)
Dept: EMERGENCY DEPT | Facility: HOSPITAL | Age: 24
End: 2024-05-14
Payer: COMMERCIAL

## 2024-05-14 ENCOUNTER — APPOINTMENT (OUTPATIENT)
Dept: GENERAL RADIOLOGY | Facility: HOSPITAL | Age: 24
End: 2024-05-14
Payer: COMMERCIAL

## 2024-05-14 LAB
C TRACH RRNA CVX QL NAA+PROBE: NOT DETECTED
CANDIDA SPECIES: NEGATIVE
GARDNERELLA VAGINALIS: POSITIVE
N GONORRHOEA RRNA SPEC QL NAA+PROBE: NOT DETECTED
T VAGINALIS DNA VAG QL PROBE+SIG AMP: NEGATIVE

## 2024-05-14 PROCEDURE — 87510 GARDNER VAG DNA DIR PROBE: CPT | Performed by: EMERGENCY MEDICINE

## 2024-05-14 PROCEDURE — 87660 TRICHOMONAS VAGIN DIR PROBE: CPT | Performed by: EMERGENCY MEDICINE

## 2024-05-14 PROCEDURE — 87480 CANDIDA DNA DIR PROBE: CPT | Performed by: EMERGENCY MEDICINE

## 2024-05-14 PROCEDURE — 87591 N.GONORRHOEAE DNA AMP PROB: CPT | Performed by: EMERGENCY MEDICINE

## 2024-05-14 PROCEDURE — 87491 CHLMYD TRACH DNA AMP PROBE: CPT | Performed by: EMERGENCY MEDICINE

## 2024-05-14 RX ORDER — METRONIDAZOLE 500 MG/1
500 TABLET ORAL 2 TIMES DAILY
Qty: 14 TABLET | Refills: 0 | Status: SHIPPED | OUTPATIENT
Start: 2024-05-14

## 2024-05-14 NOTE — DISCHARGE INSTRUCTIONS
If any of your STD screening completed in the emergency department today results positive and you need antibiotics, you should receive a phone call in the next 2 to 3 days and you may also review his results on MyChart.  Return to the emergency department for chest pain, shortness of breath, syncope, other symptoms concerning to you.

## 2024-06-20 ENCOUNTER — HOSPITAL ENCOUNTER (EMERGENCY)
Facility: HOSPITAL | Age: 24
Discharge: HOME | End: 2024-06-21
Attending: EMERGENCY MEDICINE
Payer: COMMERCIAL

## 2024-06-20 DIAGNOSIS — F15.10 STIMULANT ABUSE: Primary | ICD-10-CM

## 2024-06-20 LAB
BACTERIA UR QL AUTO: NORMAL /HPF
BASOPHILS # BLD AUTO: 0.03 10*3/MM3 (ref 0–0.2)
BASOPHILS NFR BLD AUTO: 0.3 % (ref 0–1.5)
BILIRUB UR QL STRIP: NEGATIVE
CLARITY UR: CLEAR
COLOR UR: YELLOW
DEPRECATED RDW RBC AUTO: 48.6 FL (ref 37–54)
EOSINOPHIL # BLD AUTO: 0.14 10*3/MM3 (ref 0–0.4)
EOSINOPHIL NFR BLD AUTO: 1.4 % (ref 0.3–6.2)
ERYTHROCYTE [DISTWIDTH] IN BLOOD BY AUTOMATED COUNT: 15.6 % (ref 12.3–15.4)
GLUCOSE UR STRIP-MCNC: NEGATIVE MG/DL
HCT VFR BLD AUTO: 38.1 % (ref 34–46.6)
HGB BLD-MCNC: 12.5 G/DL (ref 12–15.9)
HGB UR QL STRIP.AUTO: NEGATIVE
HYALINE CASTS UR QL AUTO: NORMAL /LPF
IMM GRANULOCYTES # BLD AUTO: 0.05 10*3/MM3 (ref 0–0.05)
IMM GRANULOCYTES NFR BLD AUTO: 0.5 % (ref 0–0.5)
KETONES UR QL STRIP: NEGATIVE
LEUKOCYTE ESTERASE UR QL STRIP.AUTO: ABNORMAL
LYMPHOCYTES # BLD AUTO: 2.47 10*3/MM3 (ref 0.7–3.1)
LYMPHOCYTES NFR BLD AUTO: 24.9 % (ref 19.6–45.3)
MCH RBC QN AUTO: 28 PG (ref 26.6–33)
MCHC RBC AUTO-ENTMCNC: 32.8 G/DL (ref 31.5–35.7)
MCV RBC AUTO: 85.4 FL (ref 79–97)
MONOCYTES # BLD AUTO: 0.49 10*3/MM3 (ref 0.1–0.9)
MONOCYTES NFR BLD AUTO: 4.9 % (ref 5–12)
NEUTROPHILS NFR BLD AUTO: 6.75 10*3/MM3 (ref 1.7–7)
NEUTROPHILS NFR BLD AUTO: 68 % (ref 42.7–76)
NITRITE UR QL STRIP: NEGATIVE
NRBC BLD AUTO-RTO: 0 /100 WBC (ref 0–0.2)
PH UR STRIP.AUTO: 8 [PH] (ref 5–8)
PLATELET # BLD AUTO: 226 10*3/MM3 (ref 140–450)
PMV BLD AUTO: 11.2 FL (ref 6–12)
PROT UR QL STRIP: NEGATIVE
RBC # BLD AUTO: 4.46 10*6/MM3 (ref 3.77–5.28)
RBC # UR STRIP: NORMAL /HPF
REF LAB TEST METHOD: NORMAL
SP GR UR STRIP: 1.01 (ref 1–1.03)
SQUAMOUS #/AREA URNS HPF: NORMAL /HPF
UROBILINOGEN UR QL STRIP: ABNORMAL
WBC # UR STRIP: NORMAL /HPF
WBC NRBC COR # BLD AUTO: 9.93 10*3/MM3 (ref 3.4–10.8)

## 2024-06-20 PROCEDURE — 80053 COMPREHEN METABOLIC PANEL: CPT | Performed by: EMERGENCY MEDICINE

## 2024-06-20 PROCEDURE — 25810000003 SODIUM CHLORIDE 0.9 % SOLUTION: Performed by: EMERGENCY MEDICINE

## 2024-06-20 PROCEDURE — 93010 ELECTROCARDIOGRAM REPORT: CPT | Performed by: INTERNAL MEDICINE

## 2024-06-20 PROCEDURE — 82550 ASSAY OF CK (CPK): CPT | Performed by: EMERGENCY MEDICINE

## 2024-06-20 PROCEDURE — 85025 COMPLETE CBC W/AUTO DIFF WBC: CPT | Performed by: EMERGENCY MEDICINE

## 2024-06-20 PROCEDURE — 36415 COLL VENOUS BLD VENIPUNCTURE: CPT

## 2024-06-20 PROCEDURE — 93005 ELECTROCARDIOGRAM TRACING: CPT | Performed by: EMERGENCY MEDICINE

## 2024-06-20 PROCEDURE — 25010000002 ONDANSETRON PER 1 MG: Performed by: EMERGENCY MEDICINE

## 2024-06-20 PROCEDURE — 84702 CHORIONIC GONADOTROPIN TEST: CPT | Performed by: EMERGENCY MEDICINE

## 2024-06-20 PROCEDURE — 99283 EMERGENCY DEPT VISIT LOW MDM: CPT

## 2024-06-20 PROCEDURE — 96375 TX/PRO/DX INJ NEW DRUG ADDON: CPT

## 2024-06-20 PROCEDURE — 87086 URINE CULTURE/COLONY COUNT: CPT | Performed by: EMERGENCY MEDICINE

## 2024-06-20 PROCEDURE — 96374 THER/PROPH/DIAG INJ IV PUSH: CPT

## 2024-06-20 PROCEDURE — 81001 URINALYSIS AUTO W/SCOPE: CPT | Performed by: EMERGENCY MEDICINE

## 2024-06-20 RX ORDER — FAMOTIDINE 10 MG/ML
20 INJECTION, SOLUTION INTRAVENOUS ONCE
Status: COMPLETED | OUTPATIENT
Start: 2024-06-20 | End: 2024-06-20

## 2024-06-20 RX ORDER — ONDANSETRON 2 MG/ML
4 INJECTION INTRAMUSCULAR; INTRAVENOUS ONCE
Status: COMPLETED | OUTPATIENT
Start: 2024-06-20 | End: 2024-06-20

## 2024-06-20 RX ADMIN — FAMOTIDINE 20 MG: 10 INJECTION INTRAVENOUS at 23:39

## 2024-06-20 RX ADMIN — SODIUM CHLORIDE 1000 ML: 9 INJECTION, SOLUTION INTRAVENOUS at 23:39

## 2024-06-20 RX ADMIN — ONDANSETRON 4 MG: 2 INJECTION INTRAMUSCULAR; INTRAVENOUS at 23:39

## 2024-06-20 NOTE — Clinical Note
[facility] EMERGENCY ROOM  [address]  [place] [address]  Phone: [phone]  Fax: [phone]    Irene Tabares was seen and treated in our emergency department on 6/20/2024.  She may return to work on 06/22/2024.         Thank you for choosing [facility].    Magno Short, DO

## 2024-06-21 VITALS
HEIGHT: 63 IN | OXYGEN SATURATION: 94 % | RESPIRATION RATE: 16 BRPM | DIASTOLIC BLOOD PRESSURE: 58 MMHG | HEART RATE: 59 BPM | TEMPERATURE: 98 F | WEIGHT: 201.72 LBS | SYSTOLIC BLOOD PRESSURE: 108 MMHG | BODY MASS INDEX: 35.74 KG/M2

## 2024-06-21 LAB
ALBUMIN SERPL-MCNC: 4.4 G/DL (ref 3.5–5.2)
ALBUMIN/GLOB SERPL: 1.5 G/DL
ALP SERPL-CCNC: 68 U/L (ref 39–117)
ALT SERPL W P-5'-P-CCNC: 18 U/L (ref 1–33)
ANION GAP SERPL CALCULATED.3IONS-SCNC: 12.1 MMOL/L (ref 5–15)
AST SERPL-CCNC: 20 U/L (ref 1–32)
BILIRUB SERPL-MCNC: 0.3 MG/DL (ref 0–1.2)
BUN SERPL-MCNC: 7 MG/DL (ref 6–20)
BUN/CREAT SERPL: 8.6 (ref 7–25)
CALCIUM SPEC-SCNC: 9.2 MG/DL (ref 8.6–10.5)
CHLORIDE SERPL-SCNC: 97 MMOL/L (ref 98–107)
CK SERPL-CCNC: 119 U/L (ref 20–180)
CO2 SERPL-SCNC: 23.9 MMOL/L (ref 22–29)
CREAT SERPL-MCNC: 0.81 MG/DL (ref 0.57–1)
EGFRCR SERPLBLD CKD-EPI 2021: 103.5 ML/MIN/1.73
GLOBULIN UR ELPH-MCNC: 2.9 GM/DL
GLUCOSE SERPL-MCNC: 110 MG/DL (ref 65–99)
HCG INTACT+B SERPL-ACNC: <0.5 MIU/ML
POTASSIUM SERPL-SCNC: 3.8 MMOL/L (ref 3.5–5.2)
PROT SERPL-MCNC: 7.3 G/DL (ref 6–8.5)
QT INTERVAL: 423 MS
QTC INTERVAL: 432 MS
SODIUM SERPL-SCNC: 133 MMOL/L (ref 136–145)

## 2024-06-21 NOTE — ED PROVIDER NOTES
Time: 11:00 PM EDT  Date of encounter:  2024  Independent Historian/Clinical History and Information was obtained by:   Patient    History is limited by: N/A    Chief Complaint: Took too much caffeine.      History of Present Illness:  Patient is a 23 y.o. year old female who presents to the emergency department for evaluation of took too much caffeine.  This patient presents to the emergency department after drinking 2 preworkout drinks along with 2 monsters and coffee and states that her body is now on fire.  She also complains of generalized weakness.  She has had some palpitations.  She has had no fever chills cough vomiting or diarrhea.  The patient is in a substance abuse treatment program.    HPI    Patient Care Team  Primary Care Provider: Russ Cobb APRN    Past Medical History:     Allergies   Allergen Reactions    Ciprofloxacin Anaphylaxis     Swelling of throat.    Latex Hives    Penicillins Anaphylaxis and Shortness Of Breath     Past Medical History:   Diagnosis Date    BV (bacterial vaginosis) 2024    Hidradenitis suppurativa     Hx of tubal ligation     Primary insomnia 2024    PTSD (post-traumatic stress disorder) 2024    Substance abuse      Past Surgical History:   Procedure Laterality Date     SECTION      x4    CYST REMOVAL      EXCISION LESION N/A 2024    Procedure: Excision of axillary and groin hidradenitis;  Surgeon: Yuan Roman MD;  Location: [facility] OR;  Service: General;  Laterality: N/A;    TUBAL ABDOMINAL LIGATION       History reviewed. No pertinent family history.    Home Medications:  Prior to Admission medications    Medication Sig Start Date End Date Taking? Authorizing Provider   clindamycin (CLEOCIN) 300 MG capsule Take 1 capsule by mouth Every 12 (Twelve) Hours.  Patient not taking: Reported on 2024   Provider, MD Reyna   clonazePAM (KlonoPIN) 0.5 MG tablet Take 1 tablet by mouth. 4/10/24   Provider  MD Renya   hydrOXYzine (ATARAX) 50 MG tablet Take 1 tablet by mouth. 4/23/24   Reyna Varghese MD   metFORMIN ER (GLUCOPHAGE-XR) 500 MG 24 hr tablet Take 1 tablet by mouth Daily.    Reyna Varghese MD   methylPREDNISolone (MEDROL) 4 MG dose pack Take as directed on package instructions. 5/23/24   Stacie Caal MD   metroNIDAZOLE (FLAGYL) 500 MG tablet Take 1 tablet by mouth 2 (Two) Times a Day. 5/14/24   Forrest Wolff PA-C   naloxone (NARCAN) 4 MG/0.1ML nasal spray Call 911. Don't prime. [place] in 1 nostril for overdose. Repeat in 2-3 minutes in other nostril if no or minimal breathing/responsiveness. 1/17/24   Yuan Roman MD   prazosin (MINIPRESS) 5 MG capsule  5/12/24   Reyna Varghese MD   sertraline (ZOLOFT) 100 MG tablet Take 1 tablet by mouth Daily. 4/22/24   Reyna Varghese MD   spironolactone (ALDACTONE) 100 MG tablet Take 1 tablet by mouth Daily. 11/21/23   Reyna Varghese MD   traZODone (DESYREL) 100 MG tablet  5/12/24   Reyna Varghese MD        Social History:   Social History     Tobacco Use    Smoking status: Every Day     Current packs/day: 0.50     Average packs/day: 0.5 packs/day for 10.0 years (5.0 ttl pk-yrs)     Types: Cigarettes     Passive exposure: Current    Smokeless tobacco: Never    Tobacco comments:     Smoked last yesterday   Vaping Use    Vaping status: Some Days   Substance Use Topics    Alcohol use: Yes     Comment: OCC    Drug use: Not Currently         Review of Systems:  Review of Systems   Constitutional:  Negative for chills and fever.   HENT:  Negative for congestion, ear pain and sore throat.    Eyes:  Negative for pain.   Respiratory:  Negative for cough, chest tightness and shortness of breath.    Cardiovascular:  Positive for palpitations. Negative for chest pain.   Gastrointestinal:  Negative for abdominal pain, diarrhea, nausea and vomiting.   Genitourinary:  Negative for flank pain and hematuria.  "  Musculoskeletal:  Negative for joint swelling.   Skin:  Negative for pallor.   Neurological:  Positive for weakness. Negative for seizures and headaches.   All other systems reviewed and are negative.       Physical Exam:  /58 (BP Location: Right arm, Patient Position: Sitting)   Pulse 59   Temp 98 °F (36.7 °C) (Oral)   Resp 16   Ht 160 cm (63\")   Wt 91.5 kg (201 lb 11.5 oz)   SpO2 94%   Breastfeeding No   BMI 35.73 kg/m²     Physical Exam  Vitals and nursing note reviewed.   Constitutional:       General: She is not in acute distress.     Appearance: Normal appearance. She is not toxic-appearing.   HENT:      Head: Normocephalic and atraumatic.      Mouth/Throat:      Mouth: Mucous membranes are moist.   Eyes:      General: No scleral icterus.  Cardiovascular:      Rate and Rhythm: Normal rate and regular rhythm.      Pulses: Normal pulses.      Heart sounds: Normal heart sounds.   Pulmonary:      Effort: Pulmonary effort is normal. No respiratory distress.      Breath sounds: Normal breath sounds.   Abdominal:      General: Abdomen is flat.      Palpations: Abdomen is soft.      Tenderness: There is no abdominal tenderness.   Musculoskeletal:         General: Normal range of motion.      Cervical back: Normal range of motion and neck supple.   Skin:     General: Skin is warm and dry.      Capillary Refill: Capillary refill takes less than 2 seconds.   Neurological:      General: No focal deficit present.      Mental Status: She is alert and oriented to person, place, and time. Mental status is at baseline.   Psychiatric:         Mood and Affect: Mood normal.         Behavior: Behavior normal.         Thought Content: Thought content normal.         Judgment: Judgment normal.                  Procedures:  Procedures      Medical Decision Making:      Comorbidities that affect care:    Substance Abuse    External Notes reviewed:    Previous Clinic Note: Urgent care visit for dermatitis.      The " following orders were placed and all results were independently analyzed by me:  Orders Placed This Encounter   Procedures    Urine Culture - Urine,    Comprehensive Metabolic Panel    hCG, Quantitative, Pregnancy    CK    CBC Auto Differential    Urinalysis With Culture If Indicated - Urine, Clean Catch    Urinalysis, Microscopic Only - Urine, Clean Catch    ECG 12 Lead Rhythm Change    CBC & Differential       Medications Given in the Emergency Department:  Medications   sodium chloride 0.9 % bolus 1,000 mL (0 mL Intravenous Stopped 6/21/24 0048)   ondansetron (ZOFRAN) injection 4 mg (4 mg Intravenous Given 6/20/24 2339)   famotidine (PEPCID) injection 20 mg (20 mg Intravenous Given 6/20/24 2339)        ED Course:       EKG: Sinus rhythm rate of 63 bpm  No acute ischemic changes.    Labs:    Lab Results (last 24 hours)       Procedure Component Value Units Date/Time    CBC & Differential [[id number]]  (Abnormal) Collected: 06/20/24 2327    Specimen: Blood Updated: 06/20/24 2337    Narrative:      The following orders were created for panel order CBC & Differential.  Procedure                               Abnormality         Status                     ---------                               -----------         ------                     CBC Auto Differential[[id number]]        Abnormal            Final result                 Please view results for these tests on the individual orders.    Comprehensive Metabolic Panel [[id number]]  (Abnormal) Collected: 06/20/24 2327    Specimen: Blood Updated: 06/21/24 0012     Glucose 110 mg/dL      BUN 7 mg/dL      Creatinine 0.81 mg/dL      Sodium 133 mmol/L      Potassium 3.8 mmol/L      Comment: Slight hemolysis detected by analyzer. Result may be falsely elevated.        Chloride 97 mmol/L      CO2 23.9 mmol/L      Calcium 9.2 mg/dL      Total Protein 7.3 g/dL      Albumin 4.4 g/dL      ALT (SGPT) 18 U/L      AST (SGOT) 20 U/L      Comment: Slight hemolysis detected by  analyzer. Result may be falsely elevated.        Alkaline Phosphatase 68 U/L      Total Bilirubin 0.3 mg/dL      Globulin 2.9 gm/dL      A/G Ratio 1.5 g/dL      BUN/Creatinine Ratio 8.6     Anion Gap 12.1 mmol/L      eGFR 103.5 mL/min/1.73     Narrative:      GFR Normal >60  Chronic Kidney Disease <60  Kidney Failure <15      hCG, Quantitative, Pregnancy [[id number]] Collected: 06/20/24 2327    Specimen: Blood Updated: 06/21/24 0022     HCG Quantitative <0.50 mIU/mL     Narrative:      HCG Ranges by Gestational Age    Females - non-pregnant premenopausal   </= 1mIU/mL HCG  Females - postmenopausal               </= 7mIU/mL HCG    3 Weeks       5.4   -      72 mIU/mL  4 Weeks      10.2   -     708 mIU/mL  5 Weeks       217   -   8,245 mIU/mL  6 Weeks       152   -  32,177 mIU/mL  7 Weeks     4,059   - 153,767 mIU/mL  8 Weeks    31,366   - 149,094 mIU/mL  9 Weeks    59,109   - 135,901 mIU/mL  10 Weeks   44,186   - 170,409 mIU/mL  12 Weeks   27,107   - 201,615 mIU/mL  14 Weeks   24,302   -  93,646 mIU/mL  15 Weeks   12,540   -  69,747 mIU/mL  16 Weeks    8,904   -  55,332 mIU/mL  17 Weeks    8,240   -  51,793 mIU/mL  18 Weeks    9,649   -  55,271 mIU/mL      CK [[id number]]  (Normal) Collected: 06/20/24 2327    Specimen: Blood Updated: 06/21/24 0007     Creatine Kinase 119 U/L     CBC Auto Differential [[id number]]  (Abnormal) Collected: 06/20/24 2327    Specimen: Blood Updated: 06/20/24 2337     WBC 9.93 10*3/mm3      RBC 4.46 10*6/mm3      Hemoglobin 12.5 g/dL      Hematocrit 38.1 %      MCV 85.4 fL      MCH 28.0 pg      MCHC 32.8 g/dL      RDW 15.6 %      RDW-SD 48.6 fl      MPV 11.2 fL      Platelets 226 10*3/mm3      Neutrophil % 68.0 %      Lymphocyte % 24.9 %      Monocyte % 4.9 %      Eosinophil % 1.4 %      Basophil % 0.3 %      Immature Grans % 0.5 %      Neutrophils, Absolute 6.75 10*3/mm3      Lymphocytes, Absolute 2.47 10*3/mm3      Monocytes, Absolute 0.49 10*3/mm3      Eosinophils, Absolute 0.14 10*3/mm3       Basophils, Absolute 0.03 10*3/mm3      Immature Grans, Absolute 0.05 10*3/mm3      nRBC 0.0 /100 WBC     Urinalysis With Culture If Indicated - Urine, Clean Catch [[id number]]  (Abnormal) Collected: 06/20/24 2335    Specimen: Urine, Clean Catch Updated: 06/20/24 2355     Color, UA Yellow     Appearance, UA Clear     pH, UA 8.0     Specific Gravity, UA 1.010     Glucose, UA Negative     Ketones, UA Negative     Bilirubin, UA Negative     Blood, UA Negative     Protein, UA Negative     Leuk Esterase, UA Trace     Nitrite, UA Negative     Urobilinogen, UA 0.2 E.U./dL    Narrative:      In absence of clinical symptoms, the presence of pyuria, bacteria, and/or nitrites on the urinalysis result does not correlate with infection.    Urinalysis, Microscopic Only - Urine, Clean Catch [[id number]] Collected: 06/20/24 2335    Specimen: Urine, Clean Catch Updated: 06/20/24 2358     RBC, UA 0-2 /HPF      WBC, UA 0-2 /HPF      Bacteria, UA None Seen /HPF      Squamous Epithelial Cells, UA 0-2 /HPF      Hyaline Casts, UA None Seen /LPF      Methodology Automated Microscopy    Urine Culture - Urine, Urine, Clean Catch [[id number]] Collected: 06/20/24 2335    Specimen: Urine, Clean Catch Updated: 06/20/24 2354             Imaging:    No Radiology Exams Resulted Within Past 24 Hours      Differential Diagnosis and Discussion:    Palpitations: Differential diagnosis includes but is not limited to anxiety, atrioventricular blocks, mitral valve disease, hypoxia, coronary artery disease, hypokalemia, anemia, fever, COPD, congestive heart failure, pericarditis, Iowna-Parkinson-White syndrome, pulmonary embolism, SVT, atrial fibrillation, atrial flutter, sinus tachycardia, thyrotoxicosis, and pheochromocytoma.    All labs were reviewed and interpreted by me.  EKG was interpreted by me.    MDM     Amount and/or Complexity of Data Reviewed  Clinical lab tests: reviewed  Tests in the medicine section of CPT®: reviewed                 Patient  Care Considerations:    CT CHEST: I considered ordering a CT scan of the chest, however the patient has no signs of pulmonary embolism.      Consultants/Shared Management Plan:    None    Social Determinants of Health:    Patient has presented with family members who are responsible, reliable and will ensure follow up care.      Disposition and Care Coordination:    Discharged: I considered escalation of care by admitting this patient to the hospital, however patient is stable and improved after treatment in the ED.    I have explained discharge medications and the need for follow up with the patient/caretakers. This was also printed in the discharge instructions. Patient was discharged with the following medications and follow up:      Medication List      No changes were made to your prescriptions during this visit.      Russ Cobb, APRN  [address] [address]  [phone]    In 1 day         Final diagnoses:   Stimulant abuse        ED Disposition       ED Disposition   Discharge    Condition   Stable    Comment   --               This medical record created using voice recognition software.             Magno Short,   06/21/24 0448

## 2024-06-21 NOTE — DISCHARGE INSTRUCTIONS
Drink plenty of fluids.  Avoid any stimulants such as caffeine, energy drinks, or other stimulants.  Return for worsening symptoms.  Follow-up with your doctor in 2 days

## 2024-06-22 LAB — BACTERIA SPEC AEROBE CULT: NORMAL

## 2024-06-27 ENCOUNTER — HOSPITAL ENCOUNTER (EMERGENCY)
Facility: HOSPITAL | Age: 24
Discharge: HOME | End: 2024-06-27
Attending: EMERGENCY MEDICINE
Payer: COMMERCIAL

## 2024-06-27 ENCOUNTER — APPOINTMENT (OUTPATIENT)
Dept: GENERAL RADIOLOGY | Facility: HOSPITAL | Age: 24
End: 2024-06-27
Payer: COMMERCIAL

## 2024-06-27 VITALS
HEART RATE: 102 BPM | OXYGEN SATURATION: 92 % | BODY MASS INDEX: 34.53 KG/M2 | DIASTOLIC BLOOD PRESSURE: 71 MMHG | TEMPERATURE: 98.5 F | WEIGHT: 194.89 LBS | HEIGHT: 63 IN | SYSTOLIC BLOOD PRESSURE: 104 MMHG | RESPIRATION RATE: 18 BRPM

## 2024-06-27 DIAGNOSIS — J18.9 PNEUMONIA OF LEFT LOWER LOBE DUE TO INFECTIOUS ORGANISM: Primary | ICD-10-CM

## 2024-06-27 LAB
FLUAV SUBTYP SPEC NAA+PROBE: NOT DETECTED
FLUBV RNA ISLT QL NAA+PROBE: NOT DETECTED
RSV RNA NPH QL NAA+NON-PROBE: NOT DETECTED
S PYO AG THROAT QL: NEGATIVE
SARS-COV-2 RNA RESP QL NAA+PROBE: NOT DETECTED

## 2024-06-27 PROCEDURE — 99283 EMERGENCY DEPT VISIT LOW MDM: CPT

## 2024-06-27 PROCEDURE — 63710000001 ONDANSETRON ODT 4 MG TABLET DISPERSIBLE

## 2024-06-27 PROCEDURE — 71045 X-RAY EXAM CHEST 1 VIEW: CPT

## 2024-06-27 PROCEDURE — 87880 STREP A ASSAY W/OPTIC: CPT

## 2024-06-27 PROCEDURE — 96372 THER/PROPH/DIAG INJ SC/IM: CPT

## 2024-06-27 PROCEDURE — 87081 CULTURE SCREEN ONLY: CPT

## 2024-06-27 PROCEDURE — 25010000002 KETOROLAC TROMETHAMINE PER 15 MG

## 2024-06-27 PROCEDURE — 87637 SARSCOV2&INF A&B&RSV AMP PRB: CPT | Performed by: EMERGENCY MEDICINE

## 2024-06-27 RX ORDER — ONDANSETRON 4 MG/1
4 TABLET, ORALLY DISINTEGRATING ORAL ONCE
Status: COMPLETED | OUTPATIENT
Start: 2024-06-27 | End: 2024-06-27

## 2024-06-27 RX ORDER — DOXYCYCLINE 100 MG/1
100 CAPSULE ORAL ONCE
Status: COMPLETED | OUTPATIENT
Start: 2024-06-27 | End: 2024-06-27

## 2024-06-27 RX ORDER — KETOROLAC TROMETHAMINE 30 MG/ML
60 INJECTION, SOLUTION INTRAMUSCULAR; INTRAVENOUS ONCE
Status: COMPLETED | OUTPATIENT
Start: 2024-06-27 | End: 2024-06-27

## 2024-06-27 RX ORDER — DOXYCYCLINE 100 MG/1
100 CAPSULE ORAL 2 TIMES DAILY
Qty: 10 CAPSULE | Refills: 0 | Status: SHIPPED | OUTPATIENT
Start: 2024-06-27 | End: 2024-07-02

## 2024-06-27 RX ADMIN — DOXYCYCLINE 100 MG: 100 CAPSULE ORAL at 13:47

## 2024-06-27 RX ADMIN — KETOROLAC TROMETHAMINE 60 MG: 30 INJECTION, SOLUTION INTRAMUSCULAR at 13:20

## 2024-06-27 RX ADMIN — ONDANSETRON 4 MG: 4 TABLET, ORALLY DISINTEGRATING ORAL at 13:20

## 2024-06-27 NOTE — DISCHARGE INSTRUCTIONS
Take the full course of antibiotics as directed for treatment of your pneumonia.  Alternate Tylenol and ibuprofen as needed for fever control.  Return to the emergency department for chest pain, shortness of breath, syncope, other symptoms concerning to you.  Please schedule a follow-up appoint with your primary care provider in 1 week to ensure that the pneumonia has resolved.

## 2024-06-27 NOTE — ED PROVIDER NOTES
Time: 1:02 PM EDT  Date of encounter:  2024  Independent Historian/Clinical History and Information was obtained by:   Patient    History is limited by: N/A    Chief Complaint: Sore throat      History of Present Illness:  Patient is a 23 y.o. year old female who presents to the emergency department for evaluation of sore throat.  Patient states 3 days ago she began developing sore throat, body aches, cough.  Patient states that her cough is productive.  She states that she is having associated nausea, vomiting, diarrhea.  Patient denies dysuria.  Patient states that she is having bilateral ear pain.  Patient denies any known sick contacts.  Patient denies any documented fevers but does state that she is having chills.  Patient also states she has had persistent headache for the last 3 days and it has not improved with Tylenol or ibuprofen.    HPI    Patient Care Team  Primary Care Provider: Russ Cobb APRN    Past Medical History:     Allergies   Allergen Reactions    Ciprofloxacin Anaphylaxis     Swelling of throat.    Latex Hives    Penicillins Anaphylaxis and Shortness Of Breath     Past Medical History:   Diagnosis Date    BV (bacterial vaginosis) 2024    Hidradenitis suppurativa     Hx of tubal ligation     Primary insomnia 2024    PTSD (post-traumatic stress disorder) 2024    Substance abuse      Past Surgical History:   Procedure Laterality Date     SECTION      x4    CYST REMOVAL      EXCISION LESION N/A 2024    Procedure: Excision of axillary and groin hidradenitis;  Surgeon: Yuan Roman MD;  Location: [facility] OR;  Service: General;  Laterality: N/A;    TUBAL ABDOMINAL LIGATION       History reviewed. No pertinent family history.    Home Medications:  Prior to Admission medications    Medication Sig Start Date End Date Taking? Authorizing Provider   clindamycin (CLEOCIN) 300 MG capsule Take 1 capsule by mouth Every 12 (Twelve) Hours.  Patient not  taking: Reported on 1/29/2024 7/14/23   Reyna Varghese MD   clonazePAM (KlonoPIN) 0.5 MG tablet Take 1 tablet by mouth. 4/10/24   Reyna Varghese MD   hydrOXYzine (ATARAX) 50 MG tablet Take 1 tablet by mouth. 4/23/24   Reyna Varghese MD   metFORMIN ER (GLUCOPHAGE-XR) 500 MG 24 hr tablet Take 1 tablet by mouth Daily.    Reyna Varghese MD   methylPREDNISolone (MEDROL) 4 MG dose pack Take as directed on package instructions. 5/23/24   Stacie Caal MD   metroNIDAZOLE (FLAGYL) 500 MG tablet Take 1 tablet by mouth 2 (Two) Times a Day. 5/14/24   Forrest Wolff PA-C   naloxone (NARCAN) 4 MG/0.1ML nasal spray Call 911. Don't prime. [place] in 1 nostril for overdose. Repeat in 2-3 minutes in other nostril if no or minimal breathing/responsiveness. 1/17/24   Yuan Roman MD   prazosin (MINIPRESS) 5 MG capsule  5/12/24   Reyna Varghese MD   sertraline (ZOLOFT) 100 MG tablet Take 1 tablet by mouth Daily. 4/22/24   Reyna Varghese MD   spironolactone (ALDACTONE) 100 MG tablet Take 1 tablet by mouth Daily. 11/21/23   Reyna Varghese MD   traZODone (DESYREL) 100 MG tablet  5/12/24   Reyna Varghese MD        Social History:   Social History     Tobacco Use    Smoking status: Every Day     Current packs/day: 0.50     Average packs/day: 0.5 packs/day for 10.0 years (5.0 ttl pk-yrs)     Types: Cigarettes     Passive exposure: Current    Smokeless tobacco: Never    Tobacco comments:     Smoked last yesterday   Vaping Use    Vaping status: Some Days   Substance Use Topics    Alcohol use: Yes     Comment: OCC    Drug use: Not Currently         Review of Systems:  Review of Systems   Constitutional:  Positive for chills. Negative for fever.   Respiratory:  Positive for cough. Negative for shortness of breath.    Cardiovascular:  Negative for chest pain.   Gastrointestinal:  Positive for diarrhea, nausea and vomiting. Negative for abdominal pain.   Genitourinary:  Negative  "for dysuria.   Musculoskeletal:  Positive for myalgias.   Neurological:  Positive for headaches.        Physical Exam:  /71 (BP Location: Left arm, Patient Position: Sitting)   Pulse 102   Temp 98.5 °F (36.9 °C) (Oral)   Resp 18   Ht 160 cm (63\")   Wt 88.4 kg (194 lb 14.2 oz)   SpO2 92%   BMI 34.52 kg/m²     Physical Exam  Vitals and nursing note reviewed.   Constitutional:       Appearance: Normal appearance. She is ill-appearing.   HENT:      Head: Normocephalic and atraumatic.        Right Ear: Tympanic membrane, ear canal and external ear normal.      Left Ear: Tympanic membrane, ear canal and external ear normal.      Nose: Nose normal.      Mouth/Throat:      Mouth: Mucous membranes are moist.      Pharynx: Oropharynx is clear. Uvula midline. Posterior oropharyngeal erythema present. No pharyngeal swelling, oropharyngeal exudate or uvula swelling.   Eyes:      Extraocular Movements: Extraocular movements intact.      Conjunctiva/sclera: Conjunctivae normal.      Pupils: Pupils are equal, round, and reactive to light.   Cardiovascular:      Rate and Rhythm: Normal rate and regular rhythm.      Heart sounds: Normal heart sounds.   Pulmonary:      Effort: Pulmonary effort is normal.      Breath sounds: Rhonchi (Left upper and lower lobe) present.   Abdominal:      General: Abdomen is flat. There is no distension.      Palpations: Abdomen is soft.   Musculoskeletal:         General: Normal range of motion.      Cervical back: Normal range of motion and neck supple.   Skin:     General: Skin is warm and dry.   Neurological:      General: No focal deficit present.      Mental Status: She is alert and oriented to person, place, and time.   Psychiatric:         Mood and Affect: Mood normal.         Behavior: Behavior normal.         Thought Content: Thought content normal.         Judgment: Judgment normal.                Procedures:  Procedures      Medical Decision Making:    Comorbidities that affect " care:    Substance abuse    External Notes reviewed:    Previous Radiological Studies: CT chest completed on 7-2-2022      The following orders were placed and all results were independently analyzed by me:  Orders Placed This Encounter   Procedures    COVID-19, FLU A/B, RSV PCR 1 HR TAT - Swab, Nasopharynx    Rapid Strep A Screen - Swab, Throat    Beta Strep Culture, Throat - Swab, Throat    XR Chest 1 View       Medications Given in the Emergency Department:  Medications   ketorolac (TORADOL) injection 60 mg (60 mg Intramuscular Given 6/27/24 1320)   ondansetron ODT (ZOFRAN-ODT) disintegrating tablet 4 mg (4 mg Oral Given 6/27/24 1320)   doxycycline (MONODOX) capsule 100 mg (100 mg Oral Given 6/27/24 1347)        ED Course:         Labs:    Lab Results (last 24 hours)       Procedure Component Value Units Date/Time    COVID-19, FLU A/B, RSV PCR 1 HR TAT - Swab, Nasopharynx [[id number]]  (Normal) Collected: 06/27/24 1248    Specimen: Swab from Nasopharynx Updated: 06/27/24 1329     COVID19 Not Detected     Influenza A PCR Not Detected     Influenza B PCR Not Detected     RSV, PCR Not Detected    Narrative:      Fact sheet for providers: https://www.fda.gov/media/553408/download    Fact sheet for patients: https://www.fda.gov/media/169146/download    Test performed by PCR.    Rapid Strep A Screen - Swab, Throat [[id number]]  (Normal) Collected: 06/27/24 1305    Specimen: Swab from Throat Updated: 06/27/24 1326     Strep A Ag Negative    Beta Strep Culture, Throat - Swab, Throat [[id number]] Collected: 06/27/24 1305    Specimen: Swab from Throat Updated: 06/27/24 1326             Imaging:    XR Chest 1 View    Result Date: 6/27/2024  XR CHEST 1 VW Date of Exam: 6/27/2024 1:10 PM EDT Indication: cough Comparison: 7/2/2022 Findings: The heart and pulmonary vasculature are within normal limits. There is mild patchy airspace opacity in the left lower lobe. No evidence of pneumothorax or pleural effusion.     Impression:  Mild patchy airspace opacity in the left lower lobe likely secondary to pneumonia. Recommend a follow-up exam after treatment to confirm resolution. Electronically Signed: Robert Rodas MD  6/27/2024 1:28 PM EDT  Workstation ID: [id number]       Differential Diagnosis and Discussion:    Sore Throat: Differential diagnosis includes but is not limited to bacterial infection, viral infection, inhaled irritants, sinus drainage, thyroiditis, epiglottitis, and retropharyngeal abscess.    All labs were reviewed and interpreted by me.  All X-rays impressions were independently interpreted by me.    MDM  Number of Diagnoses or Management Options  Diagnosis management comments: Patient presented to the emergency department today for fever, body aches, cough.  Rapid influenza, strep, COVID, and RSV testing are negative.  Chest x-ray is positive for pneumonia in the left lower lobe.  Patient was given IM Toradol in the emergency department with minimal improvement of her headache.  Patient given first dose of doxycycline in the emergency department for treatment of pneumonia.  Patient given return to the emergency department guidelines.       Amount and/or Complexity of Data Reviewed  Clinical lab tests: reviewed and ordered  Tests in the radiology section of CPT®: ordered and reviewed    Risk of Complications, Morbidity, and/or Mortality  Presenting problems: moderate  Diagnostic procedures: low  Management options: low    Patient Progress  Patient progress: stable        Consultants/Shared Management Plan:    None    Social Determinants of Health:    Patient is independent, reliable, and has access to care.       Disposition and Care Coordination:    Discharged: The patient is suitable and stable for discharge with no need for consideration of admission.    I have explained the patient´s condition, diagnoses and treatment plan based on the information available to me at this time. I have answered questions and addressed any  concerns. The patient has a good  understanding of the patient´s diagnosis, condition, and treatment plan as can be expected at this point. The vital signs have been stable. The patient´s condition is stable and appropriate for discharge from the emergency department.      The patient will pursue further outpatient evaluation with the primary care physician or other designated or consulting physician as outlined in the discharge instructions. They are agreeable to this plan of care and follow-up instructions have been explained in detail. The patient has received these instructions in written format and has expressed an understanding of the discharge instructions. The patient is aware that any significant change in condition or worsening of symptoms should prompt an immediate return to this or the closest emergency department or call to [facility].  I have explained discharge medications and the need for follow up with the patient/caretakers. This was also printed in the discharge instructions. Patient was discharged with the following medications and follow up:      Medication List        New Prescriptions      doxycycline 100 MG capsule  Commonly known as: MONODOX  Take 1 capsule by mouth 2 (Two) Times a Day for 5 days.               Where to Get Your Medications        These medications were sent to [id number] - [place], [place] - [address] AT [facility] - [phone] [facility] [phone]   [address] [place] [address]      Phone: [phone]   doxycycline 100 MG capsule      Russ Cobb, LIAM  [address] [address] [phone]    Schedule an appointment as soon as possible for a visit in 1 week         Final diagnoses:   Pneumonia of left lower lobe due to infectious organism        ED Disposition       ED Disposition   Discharge    Condition   Stable    Comment   --               This medical record created using voice  recognition software.             Forrest Wolff PA-C  06/27/24 1673

## 2024-06-28 ENCOUNTER — APPOINTMENT (OUTPATIENT)
Dept: GENERAL RADIOLOGY | Facility: HOSPITAL | Age: 24
End: 2024-06-28
Payer: COMMERCIAL

## 2024-06-28 ENCOUNTER — HOSPITAL ENCOUNTER (EMERGENCY)
Facility: HOSPITAL | Age: 24
Discharge: HOME | End: 2024-06-28
Attending: EMERGENCY MEDICINE
Payer: COMMERCIAL

## 2024-06-28 VITALS
TEMPERATURE: 98.8 F | RESPIRATION RATE: 18 BRPM | OXYGEN SATURATION: 94 % | WEIGHT: 194.89 LBS | SYSTOLIC BLOOD PRESSURE: 115 MMHG | BODY MASS INDEX: 34.53 KG/M2 | HEART RATE: 68 BPM | HEIGHT: 63 IN | DIASTOLIC BLOOD PRESSURE: 73 MMHG

## 2024-06-28 DIAGNOSIS — J18.9 PNEUMONIA OF LEFT LOWER LOBE DUE TO INFECTIOUS ORGANISM: Primary | ICD-10-CM

## 2024-06-28 LAB
ALBUMIN SERPL-MCNC: 3.7 G/DL (ref 3.5–5.2)
ALBUMIN/GLOB SERPL: 1.2 G/DL
ALP SERPL-CCNC: 68 U/L (ref 39–117)
ALT SERPL W P-5'-P-CCNC: 19 U/L (ref 1–33)
ANION GAP SERPL CALCULATED.3IONS-SCNC: 10.6 MMOL/L (ref 5–15)
AST SERPL-CCNC: 21 U/L (ref 1–32)
BASOPHILS # BLD AUTO: 0.02 10*3/MM3 (ref 0–0.2)
BASOPHILS NFR BLD AUTO: 0.3 % (ref 0–1.5)
BILIRUB SERPL-MCNC: 0.2 MG/DL (ref 0–1.2)
BUN SERPL-MCNC: 4 MG/DL (ref 6–20)
BUN/CREAT SERPL: 6.3 (ref 7–25)
CALCIUM SPEC-SCNC: 8.9 MG/DL (ref 8.6–10.5)
CHLORIDE SERPL-SCNC: 102 MMOL/L (ref 98–107)
CO2 SERPL-SCNC: 26.4 MMOL/L (ref 22–29)
CREAT SERPL-MCNC: 0.63 MG/DL (ref 0.57–1)
DEPRECATED RDW RBC AUTO: 48.2 FL (ref 37–54)
EGFRCR SERPLBLD CKD-EPI 2021: 126.4 ML/MIN/1.73
EOSINOPHIL # BLD AUTO: 0.05 10*3/MM3 (ref 0–0.4)
EOSINOPHIL NFR BLD AUTO: 0.8 % (ref 0.3–6.2)
ERYTHROCYTE [DISTWIDTH] IN BLOOD BY AUTOMATED COUNT: 15.7 % (ref 12.3–15.4)
GLOBULIN UR ELPH-MCNC: 3 GM/DL
GLUCOSE SERPL-MCNC: 123 MG/DL (ref 65–99)
HCT VFR BLD AUTO: 38 % (ref 34–46.6)
HGB BLD-MCNC: 12.5 G/DL (ref 12–15.9)
HOLD SPECIMEN: NORMAL
HOLD SPECIMEN: NORMAL
IMM GRANULOCYTES # BLD AUTO: 0.03 10*3/MM3 (ref 0–0.05)
IMM GRANULOCYTES NFR BLD AUTO: 0.5 % (ref 0–0.5)
LYMPHOCYTES # BLD AUTO: 1.28 10*3/MM3 (ref 0.7–3.1)
LYMPHOCYTES NFR BLD AUTO: 20 % (ref 19.6–45.3)
MCH RBC QN AUTO: 27.9 PG (ref 26.6–33)
MCHC RBC AUTO-ENTMCNC: 32.9 G/DL (ref 31.5–35.7)
MCV RBC AUTO: 84.8 FL (ref 79–97)
MONOCYTES # BLD AUTO: 0.33 10*3/MM3 (ref 0.1–0.9)
MONOCYTES NFR BLD AUTO: 5.2 % (ref 5–12)
NEUTROPHILS NFR BLD AUTO: 4.69 10*3/MM3 (ref 1.7–7)
NEUTROPHILS NFR BLD AUTO: 73.2 % (ref 42.7–76)
NRBC BLD AUTO-RTO: 0 /100 WBC (ref 0–0.2)
NT-PROBNP SERPL-MCNC: 53.7 PG/ML (ref 0–450)
PLATELET # BLD AUTO: 141 10*3/MM3 (ref 140–450)
PMV BLD AUTO: 9.8 FL (ref 6–12)
POTASSIUM SERPL-SCNC: 3.3 MMOL/L (ref 3.5–5.2)
PROT SERPL-MCNC: 6.7 G/DL (ref 6–8.5)
QT INTERVAL: 347 MS
QTC INTERVAL: 436 MS
RBC # BLD AUTO: 4.48 10*6/MM3 (ref 3.77–5.28)
SODIUM SERPL-SCNC: 139 MMOL/L (ref 136–145)
TROPONIN T SERPL HS-MCNC: <6 NG/L
WBC NRBC COR # BLD AUTO: 6.4 10*3/MM3 (ref 3.4–10.8)
WHOLE BLOOD HOLD COAG: NORMAL
WHOLE BLOOD HOLD SPECIMEN: NORMAL

## 2024-06-28 PROCEDURE — 84484 ASSAY OF TROPONIN QUANT: CPT | Performed by: EMERGENCY MEDICINE

## 2024-06-28 PROCEDURE — 25010000002 KETOROLAC TROMETHAMINE PER 15 MG: Performed by: EMERGENCY MEDICINE

## 2024-06-28 PROCEDURE — 93005 ELECTROCARDIOGRAM TRACING: CPT | Performed by: EMERGENCY MEDICINE

## 2024-06-28 PROCEDURE — 36415 COLL VENOUS BLD VENIPUNCTURE: CPT

## 2024-06-28 PROCEDURE — 99284 EMERGENCY DEPT VISIT MOD MDM: CPT

## 2024-06-28 PROCEDURE — 71045 X-RAY EXAM CHEST 1 VIEW: CPT

## 2024-06-28 PROCEDURE — 80053 COMPREHEN METABOLIC PANEL: CPT | Performed by: EMERGENCY MEDICINE

## 2024-06-28 PROCEDURE — 96374 THER/PROPH/DIAG INJ IV PUSH: CPT

## 2024-06-28 PROCEDURE — 83880 ASSAY OF NATRIURETIC PEPTIDE: CPT | Performed by: EMERGENCY MEDICINE

## 2024-06-28 PROCEDURE — 85025 COMPLETE CBC W/AUTO DIFF WBC: CPT | Performed by: EMERGENCY MEDICINE

## 2024-06-28 RX ORDER — POTASSIUM CHLORIDE 750 MG/1
40 CAPSULE, EXTENDED RELEASE ORAL ONCE
Status: COMPLETED | OUTPATIENT
Start: 2024-06-28 | End: 2024-06-28

## 2024-06-28 RX ORDER — KETOROLAC TROMETHAMINE 15 MG/ML
15 INJECTION, SOLUTION INTRAMUSCULAR; INTRAVENOUS ONCE
Status: COMPLETED | OUTPATIENT
Start: 2024-06-28 | End: 2024-06-28

## 2024-06-28 RX ORDER — HYDROCODONE BITARTRATE AND HOMATROPINE METHYLBROMIDE ORAL SOLUTION 5; 1.5 MG/5ML; MG/5ML
2.5 LIQUID ORAL EVERY 12 HOURS PRN
Qty: 23 ML | Refills: 0 | Status: SHIPPED | OUTPATIENT
Start: 2024-06-28 | End: 2024-07-01

## 2024-06-28 RX ORDER — SODIUM CHLORIDE 0.9 % (FLUSH) 0.9 %
10 SYRINGE (ML) INJECTION AS NEEDED
Status: DISCONTINUED | OUTPATIENT
Start: 2024-06-28 | End: 2024-06-28 | Stop reason: HOSPADM

## 2024-06-28 RX ADMIN — POTASSIUM CHLORIDE 40 MEQ: 750 CAPSULE, EXTENDED RELEASE ORAL at 15:37

## 2024-06-28 RX ADMIN — KETOROLAC TROMETHAMINE 15 MG: 15 INJECTION, SOLUTION INTRAMUSCULAR; INTRAVENOUS at 15:37

## 2024-06-28 NOTE — ED PROVIDER NOTES
Time: 3:42 PM EDT  Date of encounter:  2024  Independent Historian/Clinical History and Information was obtained by:   Patient    History is limited by: N/A    Chief Complaint: Pneumonia, cough and difficulty breathing      History of Present Illness:  Patient is a 23 y.o. year old female who presents to the emergency department for evaluation of pneumonia, cough and difficulty breathing.  Symptoms for the past 5 days.  Patient presents today after being seen yesterday and started on antibiotics.  She has only had 1 dose of antibiotics as of the time of my history and physical exam.  She complains of primarily painful coughing fits.  She is having no persistent chest pain it just hurts to cough and she is seeking relief for headache and pain from coughing.  Afebrile.  Patient has a history of substance abuse but states she has been clean for 6 years and has not abused any IV drugs or any drugs for that matter.    HPI    Patient Care Team  Primary Care Provider: Russ Cobb APRN    Past Medical History:     Allergies   Allergen Reactions    Ciprofloxacin Anaphylaxis     Swelling of throat.    Latex Hives    Penicillins Anaphylaxis and Shortness Of Breath     Past Medical History:   Diagnosis Date    BV (bacterial vaginosis) 2024    Hidradenitis suppurativa     Hx of tubal ligation     Primary insomnia 2024    PTSD (post-traumatic stress disorder) 2024    Substance abuse      Past Surgical History:   Procedure Laterality Date     SECTION      x4    CYST REMOVAL      EXCISION LESION N/A 2024    Procedure: Excision of axillary and groin hidradenitis;  Surgeon: Yuan Roman MD;  Location: [facility] MAIN OR;  Service: General;  Laterality: N/A;    TUBAL ABDOMINAL LIGATION       History reviewed. No pertinent family history.    Home Medications:  Prior to Admission medications    Medication Sig Start Date End Date Taking? Authorizing Provider   clindamycin (CLEOCIN) 300 MG  capsule Take 1 capsule by mouth Every 12 (Twelve) Hours.  Patient not taking: Reported on 1/29/2024 7/14/23   Reyna Varghese MD   clonazePAM (KlonoPIN) 0.5 MG tablet Take 1 tablet by mouth. 4/10/24   Reyna Varghese MD   doxycycline (MONODOX) 100 MG capsule Take 1 capsule by mouth 2 (Two) Times a Day for 5 days. 6/27/24 7/2/24  Forrest Wolff PA-C   HYDROcodone Bit-Homatrop MBr (HYCODAN) 5-1.5 MG/5ML solution Take 2.5 mL by mouth Every 12 (Twelve) Hours As Needed for Cough for up to 3 days. 6/28/24 7/1/24  Dada Carpio MD   hydrOXYzine (ATARAX) 50 MG tablet Take 1 tablet by mouth. 4/23/24   Reyna Varghese MD   metFORMIN ER (GLUCOPHAGE-XR) 500 MG 24 hr tablet Take 1 tablet by mouth Daily.    Reyna Varghese MD   methylPREDNISolone (MEDROL) 4 MG dose pack Take as directed on package instructions. 5/23/24   Stacie Caal MD   metroNIDAZOLE (FLAGYL) 500 MG tablet Take 1 tablet by mouth 2 (Two) Times a Day. 5/14/24   Forrest Wolff PA-C   naloxone (NARCAN) 4 MG/0.1ML nasal spray Call 911. Don't prime. [place] in 1 nostril for overdose. Repeat in 2-3 minutes in other nostril if no or minimal breathing/responsiveness. 1/17/24   Yuan Roman MD   prazosin (MINIPRESS) 5 MG capsule  5/12/24   Reyna Varghese MD   sertraline (ZOLOFT) 100 MG tablet Take 1 tablet by mouth Daily. 4/22/24   Reyna Varghese MD   spironolactone (ALDACTONE) 100 MG tablet Take 1 tablet by mouth Daily. 11/21/23   Reyna Varghese MD   traZODone (DESYREL) 100 MG tablet  5/12/24   Reyna Varghese MD        Social History:   Social History     Tobacco Use    Smoking status: Every Day     Current packs/day: 0.50     Average packs/day: 0.5 packs/day for 10.0 years (5.0 ttl pk-yrs)     Types: Cigarettes     Passive exposure: Current    Smokeless tobacco: Never    Tobacco comments:     Smoked last yesterday   Vaping Use    Vaping status: Some Days   Substance Use Topics    Alcohol use:  "Yes     Comment: OCC    Drug use: Not Currently         Review of Systems:  Review of Systems   Constitutional:  Positive for chills. Negative for fever.   HENT:  Negative for congestion, rhinorrhea and sore throat.    Eyes:  Negative for photophobia.   Respiratory:  Positive for cough and shortness of breath. Negative for apnea and chest tightness.    Cardiovascular:  Positive for chest pain. Negative for palpitations.   Gastrointestinal:  Negative for abdominal pain, diarrhea, nausea and vomiting.   Endocrine: Negative.    Genitourinary:  Negative for difficulty urinating and dysuria.   Musculoskeletal:  Negative for back pain, joint swelling and myalgias.   Skin:  Negative for color change and wound.   Allergic/Immunologic: Negative.    Neurological:  Negative for seizures and headaches.   Psychiatric/Behavioral: Negative.     All other systems reviewed and are negative.       Physical Exam:  /80 (BP Location: Left arm, Patient Position: Sitting)   Pulse 96   Temp 98.6 °F (37 °C) (Oral)   Resp 24   Ht 160 cm (63\")   Wt 88.4 kg (194 lb 14.2 oz)   SpO2 95%   BMI 34.52 kg/m²     Physical Exam  Vitals and nursing note reviewed.   Constitutional:       General: She is awake.      Appearance: Normal appearance.   HENT:      Head: Normocephalic and atraumatic.      Nose: Nose normal.      Mouth/Throat:      Mouth: Mucous membranes are moist.   Eyes:      Extraocular Movements: Extraocular movements intact.      Pupils: Pupils are equal, round, and reactive to light.   Cardiovascular:      Rate and Rhythm: Normal rate and regular rhythm.      Heart sounds: Normal heart sounds.   Pulmonary:      Effort: Pulmonary effort is normal. No respiratory distress.      Breath sounds: Normal breath sounds. No wheezing, rhonchi or rales.   Abdominal:      General: Bowel sounds are normal.      Palpations: Abdomen is soft.      Tenderness: There is no abdominal tenderness. There is no guarding or rebound.      Comments: " No rigidity   Musculoskeletal:         General: No tenderness. Normal range of motion.      Cervical back: Normal range of motion and neck supple.   Skin:     General: Skin is warm and dry.      Coloration: Skin is not jaundiced.   Neurological:      General: No focal deficit present.      Mental Status: She is alert. Mental status is at baseline.   Psychiatric:         Mood and Affect: Mood normal.                  Procedures:  Procedures      Medical Decision Making:      Comorbidities that affect care:    BV, PTSD, substance abuse, pneumonia    External Notes reviewed:    Previous Clinic Note: OB/GYN office visit 5/2/2024.  Description: Encounter for annual routine gynecological examination.      The following orders were placed and all results were independently analyzed by me:  Orders Placed This Encounter   Procedures    XR Chest 1 View    [place] Draw    Comprehensive Metabolic Panel    BNP    Single High Sensitivity Troponin T    CBC Auto Differential    NPO Diet NPO Type: Strict NPO    Undress & Gown    Continuous Pulse Oximetry    Vital Signs    Oxygen Therapy- Nasal Cannula; Titrate 1-6 LPM Per SpO2; 90 - 95%    ECG 12 Lead ED Triage Standing Order; SOA    Insert Peripheral IV    CBC & Differential    Green Top (Gel)    Lavender Top    Gold Top - SST    Light Blue Top       Medications Given in the Emergency Department:  Medications   sodium chloride 0.9 % flush 10 mL (has no administration in time range)   potassium chloride (MICRO-K/KLOR-CON) CR capsule (40 mEq Oral Given 6/28/24 1537)   ketorolac (TORADOL) injection 15 mg (15 mg Intravenous Given 6/28/24 1537)        ED Course:    ED Course as of 06/28/24 1548   Fri Jun 28, 2024   1547 I have personally interpreted the EKG today and it shows no evidence of any acute ischemia or heart arrhythmia. [RP]      ED Course User Index  [RP] Dada Carpio MD       Labs:    Lab Results (last 24 hours)       Procedure Component Value Units Date/Time    CBC &  Differential [[id number]]  (Abnormal) Collected: 06/28/24 1356    Specimen: Blood Updated: 06/28/24 1403    Narrative:      The following orders were created for panel order CBC & Differential.  Procedure                               Abnormality         Status                     ---------                               -----------         ------                     CBC Auto Differential[[id number]]        Abnormal            Final result                 Please view results for these tests on the individual orders.    Comprehensive Metabolic Panel [[id number]]  (Abnormal) Collected: 06/28/24 1356    Specimen: Blood Updated: 06/28/24 1428     Glucose 123 mg/dL      BUN 4 mg/dL      Creatinine 0.63 mg/dL      Sodium 139 mmol/L      Potassium 3.3 mmol/L      Chloride 102 mmol/L      CO2 26.4 mmol/L      Calcium 8.9 mg/dL      Total Protein 6.7 g/dL      Albumin 3.7 g/dL      ALT (SGPT) 19 U/L      AST (SGOT) 21 U/L      Alkaline Phosphatase 68 U/L      Total Bilirubin 0.2 mg/dL      Globulin 3.0 gm/dL      A/G Ratio 1.2 g/dL      BUN/Creatinine Ratio 6.3     Anion Gap 10.6 mmol/L      eGFR 126.4 mL/min/1.73     Narrative:      GFR Normal >60  Chronic Kidney Disease <60  Kidney Failure <15      BNP [[id number]]  (Normal) Collected: 06/28/24 1356    Specimen: Blood Updated: 06/28/24 1433     proBNP 53.7 pg/mL     Narrative:      This assay is used as an aid in the diagnosis of individuals suspected of having heart failure. It can be used as an aid in the diagnosis of acute decompensated heart failure (ADHF) in patients presenting with signs and symptoms of ADHF to the emergency department (ED). In addition, NT-proBNP of <300 pg/mL indicates ADHF is not likely.    Age Range Result Interpretation  NT-proBNP Concentration (pg/mL:      <50             Positive            >450                   Gray                 300-450                    Negative             <300    50-75           Positive            >900                   Jo                300-900                  Negative            <300      >75             Positive            >1800                  Gray                300-1800                  Negative            <300    Single High Sensitivity Troponin T [[id number]]  (Normal) Collected: 06/28/24 1356    Specimen: Blood Updated: 06/28/24 1433     HS Troponin T <6 ng/L     Narrative:      High Sensitive Troponin T Reference Range:  <14.0 ng/L- Negative Female for AMI  <22.0 ng/L- Negative Male for AMI  >=14 - Abnormal Female indicating possible myocardial injury.  >=22 - Abnormal Male indicating possible myocardial injury.   Clinicians would have to utilize clinical acumen, EKG, Troponin, and serial changes to determine if it is an Acute Myocardial Infarction or myocardial injury due to an underlying chronic condition.         CBC Auto Differential [[id number]]  (Abnormal) Collected: 06/28/24 1356    Specimen: Blood Updated: 06/28/24 1403     WBC 6.40 10*3/mm3      RBC 4.48 10*6/mm3      Hemoglobin 12.5 g/dL      Hematocrit 38.0 %      MCV 84.8 fL      MCH 27.9 pg      MCHC 32.9 g/dL      RDW 15.7 %      RDW-SD 48.2 fl      MPV 9.8 fL      Platelets 141 10*3/mm3      Neutrophil % 73.2 %      Lymphocyte % 20.0 %      Monocyte % 5.2 %      Eosinophil % 0.8 %      Basophil % 0.3 %      Immature Grans % 0.5 %      Neutrophils, Absolute 4.69 10*3/mm3      Lymphocytes, Absolute 1.28 10*3/mm3      Monocytes, Absolute 0.33 10*3/mm3      Eosinophils, Absolute 0.05 10*3/mm3      Basophils, Absolute 0.02 10*3/mm3      Immature Grans, Absolute 0.03 10*3/mm3      nRBC 0.0 /100 WBC              Imaging:    XR Chest 1 View    Result Date: 6/28/2024  XR CHEST 1 VW Date of Exam: 6/28/2024 1:53 PM EDT Indication: SOA Triage Protocol Comparison: 6/27/2024 Findings: Stable patchy airspace opacity in the left lower lobe. The heart and pulmonary vasculature are within normal limits. No evidence of pneumothorax or pleural effusion.     Impression:  Stable left lower lobe airspace opacity likely secondary to pneumonia. Electronically Signed: Robert Rodas MD  6/28/2024 2:09 PM EDT  Workstation ID: [id number]       Differential Diagnosis and Discussion:    Dyspnea: Differential diagnosis includes but is not limited to metabolic acidosis, neurological disorders, psychogenic, asthma, pneumothorax, upper airway obstruction, COPD, pneumonia, noncardiogenic pulmonary edema, interstitial lung disease, anemia, congestive heart failure, and pulmonary embolism    All labs were reviewed and interpreted by me.  All X-rays impressions were independently interpreted by me.  EKG was interpreted by me.    MDM               Patient Care Considerations:    CT CHEST: I considered ordering a CT scan of the chest, however PERC negative and patient is only having pain with cough.      Consultants/Shared Management Plan:    None    Social Determinants of Health:    Patient is independent, reliable, and has access to care.       Disposition and Care Coordination:    Discharged: I considered escalation of care by admitting this patient to the hospital, however patient is not hypoxic, tachypneic or febrile.  Her white blood cell count is normal.  Her chest x-ray is not worsening from yesterday.    I have explained the patient´s condition, diagnoses and treatment plan based on the information available to me at this time. I have answered questions and addressed any concerns. The patient has a good  understanding of the patient´s diagnosis, condition, and treatment plan as can be expected at this point. The vital signs have been stable. The patient´s condition is stable and appropriate for discharge from the emergency department.      The patient will pursue further outpatient evaluation with the primary care physician or other designated or consulting physician as outlined in the discharge instructions. They are agreeable to this plan of care and follow-up instructions have been explained  in detail. The patient has received these instructions in written format and has expressed an understanding of the discharge instructions. The patient is aware that any significant change in condition or worsening of symptoms should prompt an immediate return to this or the closest emergency department or call to 911.    Final diagnoses:   Pneumonia of left lower lobe due to infectious organism        ED Disposition       ED Disposition   Discharge    Condition   Stable    Comment   --               This medical record created using voice recognition software.             Dada Carpio MD  06/28/24 1102

## 2024-06-28 NOTE — DISCHARGE INSTRUCTIONS
Return to the emergency department for worsening of symptoms.  Continue to take your full prescribed antibiotics until completed.  Stay well-hydrated.

## 2024-06-28 NOTE — Clinical Note
[facility] EMERGENCY ROOM  [address]  [place] [address]  Phone: [phone]  Fax: [phone]    Irene Tabares was seen and treated in our emergency department on 6/28/2024.  She may return to work on 07/02/2024.         Thank you for choosing [facility].    Dada Carpio MD

## 2024-06-29 LAB — BACTERIA SPEC AEROBE CULT: NORMAL

## 2024-07-10 ENCOUNTER — APPOINTMENT (OUTPATIENT)
Dept: CT IMAGING | Facility: HOSPITAL | Age: 24
End: 2024-07-10
Payer: COMMERCIAL

## 2024-07-10 ENCOUNTER — HOSPITAL ENCOUNTER (EMERGENCY)
Facility: HOSPITAL | Age: 24
Discharge: HOME | End: 2024-07-10
Attending: EMERGENCY MEDICINE
Payer: COMMERCIAL

## 2024-07-10 VITALS
RESPIRATION RATE: 18 BRPM | OXYGEN SATURATION: 97 % | DIASTOLIC BLOOD PRESSURE: 99 MMHG | HEART RATE: 82 BPM | HEIGHT: 63 IN | WEIGHT: 196.87 LBS | TEMPERATURE: 98.3 F | BODY MASS INDEX: 34.88 KG/M2 | SYSTOLIC BLOOD PRESSURE: 120 MMHG

## 2024-07-10 DIAGNOSIS — M71.331 OTHER BURSAL CYST, RIGHT WRIST: ICD-10-CM

## 2024-07-10 DIAGNOSIS — G89.29 CHRONIC PAIN OF RIGHT WRIST: Primary | ICD-10-CM

## 2024-07-10 DIAGNOSIS — M25.531 CHRONIC PAIN OF RIGHT WRIST: Primary | ICD-10-CM

## 2024-07-10 LAB
ALBUMIN SERPL-MCNC: 4 G/DL (ref 3.5–5.2)
ALBUMIN/GLOB SERPL: 1.3 G/DL
ALP SERPL-CCNC: 62 U/L (ref 39–117)
ALT SERPL W P-5'-P-CCNC: 15 U/L (ref 1–33)
ANION GAP SERPL CALCULATED.3IONS-SCNC: 10.2 MMOL/L (ref 5–15)
AST SERPL-CCNC: 14 U/L (ref 1–32)
BASOPHILS # BLD AUTO: 0.05 10*3/MM3 (ref 0–0.2)
BASOPHILS NFR BLD AUTO: 0.5 % (ref 0–1.5)
BILIRUB SERPL-MCNC: <0.2 MG/DL (ref 0–1.2)
BUN SERPL-MCNC: 5 MG/DL (ref 6–20)
BUN/CREAT SERPL: 7.2 (ref 7–25)
CALCIUM SPEC-SCNC: 9 MG/DL (ref 8.6–10.5)
CHLORIDE SERPL-SCNC: 101 MMOL/L (ref 98–107)
CO2 SERPL-SCNC: 26.8 MMOL/L (ref 22–29)
CREAT SERPL-MCNC: 0.69 MG/DL (ref 0.57–1)
DEPRECATED RDW RBC AUTO: 49.5 FL (ref 37–54)
EGFRCR SERPLBLD CKD-EPI 2021: 123.7 ML/MIN/1.73
EOSINOPHIL # BLD AUTO: 0.19 10*3/MM3 (ref 0–0.4)
EOSINOPHIL NFR BLD AUTO: 1.9 % (ref 0.3–6.2)
ERYTHROCYTE [DISTWIDTH] IN BLOOD BY AUTOMATED COUNT: 15.5 % (ref 12.3–15.4)
GLOBULIN UR ELPH-MCNC: 3 GM/DL
GLUCOSE SERPL-MCNC: 90 MG/DL (ref 65–99)
HCT VFR BLD AUTO: 38.5 % (ref 34–46.6)
HGB BLD-MCNC: 12.3 G/DL (ref 12–15.9)
HOLD SPECIMEN: NORMAL
HOLD SPECIMEN: NORMAL
IMM GRANULOCYTES # BLD AUTO: 0.04 10*3/MM3 (ref 0–0.05)
IMM GRANULOCYTES NFR BLD AUTO: 0.4 % (ref 0–0.5)
LYMPHOCYTES # BLD AUTO: 2.74 10*3/MM3 (ref 0.7–3.1)
LYMPHOCYTES NFR BLD AUTO: 27.3 % (ref 19.6–45.3)
MCH RBC QN AUTO: 28 PG (ref 26.6–33)
MCHC RBC AUTO-ENTMCNC: 31.9 G/DL (ref 31.5–35.7)
MCV RBC AUTO: 87.7 FL (ref 79–97)
MONOCYTES # BLD AUTO: 0.34 10*3/MM3 (ref 0.1–0.9)
MONOCYTES NFR BLD AUTO: 3.4 % (ref 5–12)
NEUTROPHILS NFR BLD AUTO: 6.68 10*3/MM3 (ref 1.7–7)
NEUTROPHILS NFR BLD AUTO: 66.5 % (ref 42.7–76)
NRBC BLD AUTO-RTO: 0 /100 WBC (ref 0–0.2)
PLATELET # BLD AUTO: 270 10*3/MM3 (ref 140–450)
PMV BLD AUTO: 10.2 FL (ref 6–12)
POTASSIUM SERPL-SCNC: 3.6 MMOL/L (ref 3.5–5.2)
PROT SERPL-MCNC: 7 G/DL (ref 6–8.5)
RBC # BLD AUTO: 4.39 10*6/MM3 (ref 3.77–5.28)
SODIUM SERPL-SCNC: 138 MMOL/L (ref 136–145)
WBC NRBC COR # BLD AUTO: 10.04 10*3/MM3 (ref 3.4–10.8)
WHOLE BLOOD HOLD COAG: NORMAL
WHOLE BLOOD HOLD SPECIMEN: NORMAL

## 2024-07-10 PROCEDURE — 96374 THER/PROPH/DIAG INJ IV PUSH: CPT

## 2024-07-10 PROCEDURE — 25510000001 IOPAMIDOL PER 1 ML: Performed by: EMERGENCY MEDICINE

## 2024-07-10 PROCEDURE — 99285 EMERGENCY DEPT VISIT HI MDM: CPT

## 2024-07-10 PROCEDURE — 85025 COMPLETE CBC W/AUTO DIFF WBC: CPT

## 2024-07-10 PROCEDURE — 25010000002 KETOROLAC TROMETHAMINE PER 15 MG: Performed by: NURSE PRACTITIONER

## 2024-07-10 PROCEDURE — 73201 CT UPPER EXTREMITY W/DYE: CPT

## 2024-07-10 PROCEDURE — 80053 COMPREHEN METABOLIC PANEL: CPT

## 2024-07-10 PROCEDURE — 36415 COLL VENOUS BLD VENIPUNCTURE: CPT

## 2024-07-10 RX ORDER — KETOROLAC TROMETHAMINE 30 MG/ML
30 INJECTION, SOLUTION INTRAMUSCULAR; INTRAVENOUS ONCE
Status: COMPLETED | OUTPATIENT
Start: 2024-07-10 | End: 2024-07-10

## 2024-07-10 RX ADMIN — IOPAMIDOL 80 ML: 755 INJECTION, SOLUTION INTRAVENOUS at 22:18

## 2024-07-10 RX ADMIN — KETOROLAC TROMETHAMINE 30 MG: 30 INJECTION, SOLUTION INTRAMUSCULAR; INTRAVENOUS at 21:33

## 2024-07-10 NOTE — Clinical Note
[facility] EMERGENCY ROOM  [address]  [place] [address]  Phone: [phone]  Fax: [phone]    Irene Tabares was seen and treated in our emergency department on 7/10/2024.  She may return to work on 07/12/2024.         Thank you for choosing [facility].    Jonathan Thompson, DO

## 2024-07-11 NOTE — ED PROVIDER NOTES
Time: 8:47 PM EDT  Date of encounter:  7/10/2024  Independent Historian/Clinical History and Information was obtained by:   Patient    History is limited by: N/A    Chief Complaint   Patient presents with    Cyst         History of Present Illness:  Patient is a 23 y.o. year old female who presents to the emergency department for evaluation of right wrist pain. She has a growing cyst on her right wrist.  She had hand surgery for complex fracture and injury back in February.  She states that this cyst has been growing significantly in the past 6 weeks.  She did see Jeannie Ruth today who wanted her to get an MRI.  She states that they told her they were going to put an order for an outpatient MRI through [facility] so she can get it down here as well as a CT with contrast because of the fact that they think it is laying on her artery and if they do surgery they want to know exactly how it is laying and what structures.  She comes in to the ED to get an MRI of her hand because she states she is so anxious thinking about this that she cannot rest.  She believes that it is pressing on her circulation even going up to her brain.  No new injury (Triage Provider: Brandon Norman PA-C).  Patient reports pain with palpation as well as with any range of motion or compression       Patient Care Team  Primary Care Provider: Russ Cobb APRN    Past Medical History:     Allergies   Allergen Reactions    Ciprofloxacin Anaphylaxis     Swelling of throat.    Latex Hives    Penicillins Anaphylaxis and Shortness Of Breath     Past Medical History:   Diagnosis Date    BV (bacterial vaginosis) 2024    Hidradenitis suppurativa     Hx of tubal ligation     Primary insomnia 2024    PTSD (post-traumatic stress disorder) 2024    Substance abuse      Past Surgical History:   Procedure Laterality Date     SECTION      x4    CYST REMOVAL      EXCISION LESION N/A 2024    Procedure: Excision of axillary and groin  hidradenitis;  Surgeon: Yuan Roman MD;  Location: [facility] MAIN OR;  Service: General;  Laterality: N/A;    TUBAL ABDOMINAL LIGATION       History reviewed. No pertinent family history.    Home Medications:  Prior to Admission medications    Medication Sig Start Date End Date Taking? Authorizing Provider   clindamycin (CLEOCIN) 300 MG capsule Take 1 capsule by mouth Every 12 (Twelve) Hours.  Patient not taking: Reported on 1/29/2024 7/14/23   Reyna Varghese MD   clonazePAM (KlonoPIN) 0.5 MG tablet Take 1 tablet by mouth. 4/10/24   Reyna Varghese MD   hydrOXYzine (ATARAX) 50 MG tablet Take 1 tablet by mouth. 4/23/24   Reyna Varghese MD   metFORMIN ER (GLUCOPHAGE-XR) 500 MG 24 hr tablet Take 1 tablet by mouth Daily.    Reyna Varghese MD   methylPREDNISolone (MEDROL) 4 MG dose pack Take as directed on package instructions. 5/23/24   Stacie Caal MD   metroNIDAZOLE (FLAGYL) 500 MG tablet Take 1 tablet by mouth 2 (Two) Times a Day. 5/14/24   oFrrest Wolff PA-C   naloxone (NARCAN) 4 MG/0.1ML nasal spray Call 911. Don't prime. [place] in 1 nostril for overdose. Repeat in 2-3 minutes in other nostril if no or minimal breathing/responsiveness. 1/17/24   Yuan Roman MD   prazosin (MINIPRESS) 5 MG capsule  5/12/24   Reyna Varghese MD   sertraline (ZOLOFT) 100 MG tablet Take 1 tablet by mouth Daily. 4/22/24   Reyna Varghese MD   spironolactone (ALDACTONE) 100 MG tablet Take 1 tablet by mouth Daily. 11/21/23   Reyna Varghese MD   traZODone (DESYREL) 100 MG tablet  5/12/24   Reyna Varghese MD        Social History:   Social History     Tobacco Use    Smoking status: Every Day     Current packs/day: 0.50     Average packs/day: 0.5 packs/day for 10.0 years (5.0 ttl pk-yrs)     Types: Cigarettes     Passive exposure: Current    Smokeless tobacco: Never    Tobacco comments:     Smoked last yesterday   Vaping Use    Vaping status: Some Days   Substance Use  "Topics    Alcohol use: Yes     Comment: OCC    Drug use: Not Currently         Review of Systems:  Review of Systems   Constitutional:  Negative for fever.   Respiratory:  Negative for shortness of breath.    Cardiovascular:  Negative for chest pain.   Musculoskeletal:  Positive for arthralgias (Left wrist) and joint swelling (Left wrist).   Skin:  Negative for color change.   Neurological:  Negative for weakness and numbness.   Hematological: Negative.    Psychiatric/Behavioral:  The patient is nervous/anxious.    All other systems reviewed and are negative.       Physical Exam:  /58 (BP Location: Left arm, Patient Position: Sitting)   Pulse 85   Temp 97.8 °F (36.6 °C) (Oral)   Resp 18   Ht 160 cm (63\")   Wt 89.3 kg (196 lb 13.9 oz)   SpO2 99%   BMI 34.87 kg/m²         Physical Exam  Vitals and nursing note reviewed.   Constitutional:       Appearance: Normal appearance.   HENT:      Head: Atraumatic.   Eyes:      Conjunctiva/sclera: Conjunctivae normal.   Cardiovascular:      Rate and Rhythm: Normal rate and regular rhythm.      Pulses: Normal pulses.      Heart sounds: Normal heart sounds.   Pulmonary:      Effort: Pulmonary effort is normal.      Breath sounds: Normal breath sounds.   Musculoskeletal:         General: Swelling (Mild swelling radial aspect of volar wrist) and tenderness (Tenderness is cystic structure left volar radial wrist) present. Normal range of motion.      Cervical back: Normal range of motion.   Skin:     General: Skin is warm and dry.      Capillary Refill: Capillary refill takes less than 2 seconds.      Findings: No erythema.      Comments: There is a palpable cystic area at the left radial wrist possible nickel size in diameter with a elevation of about 2 mm.  No erythema   Neurological:      General: No focal deficit present.      Mental Status: She is alert.      Sensory: No sensory deficit.      Motor: No weakness.   Psychiatric:         Mood and Affect: Mood normal.   "       Behavior: Behavior normal.                  Medical Decision Making:      Comorbidities that affect care:    PTSD, anxiety, smoking    External Notes reviewed:    Previous ED Note: Patient last visit was to the ED back in June on the 28th for left lower lobe pneumonia      The following orders were placed and all results were independently analyzed by me:  Orders Placed This Encounter   Procedures    [facility] 06.  Wrist Brace    CT Upper Extremity Right With Contrast    Comprehensive Metabolic Panel    CBC Auto Differential    [place] Draw    CBC & Differential    Green Top (Gel)    Lavender Top    Gold Top - SST    Light Blue Top       Medications Given in the Emergency Department:  Medications   ketorolac (TORADOL) injection 30 mg (30 mg Intravenous Given 7/10/24 2133)   iopamidol (ISOVUE-370) 76 % injection 100 mL (80 mL Intravenous Given 7/10/24 2218)        ED Course:    The patient was initially evaluated in the triage area where orders were placed. The patient was later dispositioned by LIAM Ang.      The patient was advised to stay for completion of workup which includes but is not limited to communication of labs and radiological results, reassessment and plan. The patient was advised that leaving prior to disposition by a provider could result in critical findings that are not communicated to the patient.     ED Course as of 07/10/24 2239   Wed Jul 10, 2024   2050 PROVIDER IN TRIAGE  Patient was evaluated by me, Brandon Norman PA-C. Orders were placed and awaiting final results and disposition.   [MV]   2236 CT Upper Extremity Right With Contrast  Negative CT [DS]      ED Course User Index  [DS] Karla Viramontes APRN  [MV] Brandon Norman PA       Labs:    Lab Results (last 24 hours)       Procedure Component Value Units Date/Time    CBC & Differential [[id number]]  (Abnormal) Collected: 07/10/24 2052    Specimen: Blood from Arm, Left Updated: 07/10/24 2116    Narrative:      The  following orders were created for panel order CBC & Differential.  Procedure                               Abnormality         Status                     ---------                               -----------         ------                     CBC Auto Differential[[id number]]        Abnormal            Final result                 Please view results for these tests on the individual orders.    Comprehensive Metabolic Panel [[id number]]  (Abnormal) Collected: 07/10/24 2052    Specimen: Blood from Arm, Left Updated: 07/10/24 2141     Glucose 90 mg/dL      BUN 5 mg/dL      Creatinine 0.69 mg/dL      Sodium 138 mmol/L      Potassium 3.6 mmol/L      Chloride 101 mmol/L      CO2 26.8 mmol/L      Calcium 9.0 mg/dL      Total Protein 7.0 g/dL      Albumin 4.0 g/dL      ALT (SGPT) 15 U/L      AST (SGOT) 14 U/L      Alkaline Phosphatase 62 U/L      Total Bilirubin <0.2 mg/dL      Globulin 3.0 gm/dL      A/G Ratio 1.3 g/dL      BUN/Creatinine Ratio 7.2     Anion Gap 10.2 mmol/L      eGFR 123.7 mL/min/1.73     Narrative:      GFR Normal >60  Chronic Kidney Disease <60  Kidney Failure <15      CBC Auto Differential [[id number]]  (Abnormal) Collected: 07/10/24 2052    Specimen: Blood from Arm, Left Updated: 07/10/24 2116     WBC 10.04 10*3/mm3      RBC 4.39 10*6/mm3      Hemoglobin 12.3 g/dL      Hematocrit 38.5 %      MCV 87.7 fL      MCH 28.0 pg      MCHC 31.9 g/dL      RDW 15.5 %      RDW-SD 49.5 fl      MPV 10.2 fL      Platelets 270 10*3/mm3      Neutrophil % 66.5 %      Lymphocyte % 27.3 %      Monocyte % 3.4 %      Eosinophil % 1.9 %      Basophil % 0.5 %      Immature Grans % 0.4 %      Neutrophils, Absolute 6.68 10*3/mm3      Lymphocytes, Absolute 2.74 10*3/mm3      Monocytes, Absolute 0.34 10*3/mm3      Eosinophils, Absolute 0.19 10*3/mm3      Basophils, Absolute 0.05 10*3/mm3      Immature Grans, Absolute 0.04 10*3/mm3      nRBC 0.0 /100 WBC              Imaging:    CT Upper Extremity Right With Contrast    Result Date:  7/10/2024  CT UPPER EXTREMITY RIGHT W CONTRAST Date of Exam: 7/10/2024 10:08 PM EDT Indication: right wrist pain, possible cyst/abscess. Knot on the wrist. Comparison: Radiographs 2/14/2024 Technique: Axial CT images were obtained of the right wrist after the uneventful intravenous administration of iodinated contrast.  Reconstructed coronal and sagittal images were also obtained. Automated exposure control and iterative construction methods were used. Findings: No fracture or malalignment. No bone erosion or destruction. No radiopaque foreign body. No soft tissue gas. There is a skin marker on the ventral side of the distal forearm. Deep to this skin marker, no abnormality is identified. Additionally, there are  no fluid collections in the visualized soft tissues. No radiopaque foreign bodies. No definite soft tissue mass.     Negative CT of the wrist with contrast. Should symptoms persist, consider outpatient evaluation with MRI. Electronically Signed: Bismark Servin MD  7/10/2024 10:31 PM EDT  Workstation ID: [id number]       Differential Diagnosis and Discussion:      Extremity Pain: Differential diagnosis includes but is not limited to soft tissue sprain, tendonitis, tendon injury, dislocation, fracture, deep vein thrombosis, arterial insufficiency, osteoarthritis, bursitis, and ligamentous damage.    All labs were reviewed and interpreted by me.  CT scan radiology impression was interpreted by me.    MDM  Number of Diagnoses or Management Options  Chronic pain of right wrist  Other bursal cyst, right wrist  Diagnosis management comments: The patient was placed in a wrist brace splint in the emergency department for comfort and support only. The patient was reassessed status post splinting. The patient in neurovascular intact with no numbness, tingling, or signs of compartment syndrome. The patient was counseled to follow up with the orthopedic surgeon as detailed in the discharge instructions. The patient was  counseled on the signs and symptoms of compartment syndrome including worsening pain, swelling, sensory abnormalities, and color change. The patient was instructed to return to the ED sooner for re-evaluation of any of these symptoms.       Amount and/or Complexity of Data Reviewed  Clinical lab tests: reviewed and ordered  Tests in the radiology section of CPT®: reviewed and ordered  Tests in the medicine section of CPT®: ordered and reviewed    Risk of Complications, Morbidity, and/or Mortality  Presenting problems: low  Diagnostic procedures: moderate  Management options: low    Patient Progress  Patient progress: stable         Patient Care Considerations:    CONSULT: I considered consulting patient's orthopedic surgeon at [facility], however has been ongoing and they are evaluating it with outpatient MRI that supposed to be ordered tomorrow and she can follow-up outpatient      Consultants/Shared Management Plan:    None    Social Determinants of Health:    Patient is independent, reliable, and has access to care.       Disposition and Care Coordination:    Discharged: I considered escalation of care by admitting this patient to the hospital, however emergent findings on CT no physical findings of impaired circulation or vascular deficit    I have explained the patient´s condition, diagnoses and treatment plan based on the information available to me at this time. I have answered questions and addressed any concerns. The patient has a good  understanding of the patient´s diagnosis, condition, and treatment plan as can be expected at this point. The vital signs have been stable. The patient´s condition is stable and appropriate for discharge from the emergency department.      The patient will pursue further outpatient evaluation with the primary care physician or other designated or consulting physician as outlined in the discharge instructions. They are agreeable to this plan of care and follow-up  instructions have been explained in detail. The patient has received these instructions in written format and has expressed an understanding of the discharge instructions. The patient is aware that any significant change in condition or worsening of symptoms should prompt an immediate return to this or the closest emergency department or call to 911.  I have explained discharge medications and the need for follow up with the patient/caretakers. This was also printed in the discharge instructions. Patient was discharged with the following medications and follow up:      Medication List      No changes were made to your prescriptions during this visit.      [facility]  [address] [address] [phone]  Call in 1 day         Final diagnoses:   Chronic pain of right wrist   Other bursal cyst, right wrist        ED Disposition       ED Disposition   Discharge    Condition   Stable    Comment   --               This medical record created using voice recognition software.             Karla Viramontes, LIAM  07/10/24 [id number]

## 2024-07-11 NOTE — DISCHARGE INSTRUCTIONS
CT was unremarkable and did not show any acute abnormalities that require emergent intervention.    Get MRI as planned by your hand doctor.  Call tomorrow if you do not get notified of your scheduling time.    Ace wrap or brace for comfort and support.    Tylenol and Motrin for pain.

## 2024-07-12 ENCOUNTER — TRANSCRIBE ORDERS (OUTPATIENT)
Dept: ADMINISTRATIVE | Facility: HOSPITAL | Age: 24
End: 2024-07-12
Payer: COMMERCIAL

## 2024-07-12 DIAGNOSIS — R22.31 LUMP OF RIGHT WRIST: Primary | ICD-10-CM

## 2024-07-13 LAB
QT INTERVAL: 347 MS
QTC INTERVAL: 436 MS

## (undated) DEVICE — GOWN,REINFRCE,POLY,SIRUS,BREATH SLV,XXLG: Brand: MEDLINE

## (undated) DEVICE — DRSNG WND GZ CURAD OIL EMULSION 3X8IN LF STRL 1PK

## (undated) DEVICE — SUT VIC 3/0 SH 27IN J416H

## (undated) DEVICE — GLOVE,SURG,SENSICARE SLT,LF,PF,7.5: Brand: MEDLINE

## (undated) DEVICE — STERILE POLYISOPRENE POWDER-FREE SURGICAL GLOVES WITH EMOLLIENT COATING: Brand: PROTEXIS

## (undated) DEVICE — PENCL E/S SMOKEEVAC W/TELESCP CANN

## (undated) DEVICE — 1/4 IN. X 18 IN. LENGTH: Brand: SILICONE TUBING, PENROSE DRAIN

## (undated) DEVICE — MAJOR-LF: Brand: MEDLINE INDUSTRIES, INC.

## (undated) DEVICE — INTENDED FOR TISSUE SEPARATION, AND OTHER PROCEDURES THAT REQUIRE A SHARP SURGICAL BLADE TO PUNCTURE OR CUT.: Brand: BARD-PARKER ® CARBON RIB-BACK BLADES

## (undated) DEVICE — SOL IRR NACL 0.9PCT BT 1000ML

## (undated) DEVICE — ANTIBACTERIAL UNDYED BRAIDED (POLYGLACTIN 910), SYNTHETIC ABSORBABLE SUTURE: Brand: COATED VICRYL

## (undated) DEVICE — ADHS SKIN SURG TISS VISC PREMIERPRO EXOFIN HI/VISC FAST/DRY

## (undated) DEVICE — GLOVE,SURG,SENSICARE SLT,LF,PF,6.5: Brand: MEDLINE